# Patient Record
Sex: MALE | Race: WHITE | Employment: FULL TIME | URBAN - METROPOLITAN AREA
[De-identification: names, ages, dates, MRNs, and addresses within clinical notes are randomized per-mention and may not be internally consistent; named-entity substitution may affect disease eponyms.]

---

## 2017-01-30 PROBLEM — F41.9 ANXIETY: Status: ACTIVE | Noted: 2017-01-30

## 2017-03-02 ENCOUNTER — HOSPITAL ENCOUNTER (OUTPATIENT)
Dept: LAB | Age: 62
Discharge: HOME | End: 2017-03-02
Payer: COMMERCIAL

## 2017-03-02 LAB
ALBUMIN SERPL BCP-MCNC: 4.1 G/DL (ref 3.2–4.6)
ALBUMIN/GLOB SERPL: 1 {RATIO} (ref 1.2–3.5)
ALP SERPL-CCNC: 64 U/L (ref 50–136)
ALT SERPL-CCNC: 37 U/L (ref 12–65)
ANION GAP BLD CALC-SCNC: 9 MMOL/L (ref 7–16)
APTT PPP: 26.4 SEC (ref 25.3–32.9)
AST SERPL W P-5'-P-CCNC: 32 U/L (ref 15–37)
BASOPHILS # BLD AUTO: 0 K/UL (ref 0–0.2)
BASOPHILS # BLD: 1 % (ref 0–2)
BILIRUB SERPL-MCNC: 0.6 MG/DL (ref 0.2–1.1)
BUN SERPL-MCNC: 19 MG/DL (ref 8–23)
CALCIUM SERPL-MCNC: 9.2 MG/DL (ref 8.3–10.4)
CHLORIDE SERPL-SCNC: 100 MMOL/L (ref 98–107)
CO2 SERPL-SCNC: 28 MMOL/L (ref 21–32)
CREAT SERPL-MCNC: 0.76 MG/DL (ref 0.8–1.5)
DIFFERENTIAL METHOD BLD: ABNORMAL
EOSINOPHIL # BLD: 0.3 K/UL (ref 0–0.8)
EOSINOPHIL NFR BLD: 4 % (ref 0.5–7.8)
ERYTHROCYTE [DISTWIDTH] IN BLOOD BY AUTOMATED COUNT: 14.3 % (ref 11.9–14.6)
EST. AVERAGE GLUCOSE BLD GHB EST-MCNC: 177 MG/DL
FERRITIN SERPL-MCNC: 177 NG/ML (ref 8–388)
FOLATE SERPL-MCNC: 50.4 NG/ML (ref 3.1–17.5)
GLOBULIN SER CALC-MCNC: 4 G/DL (ref 2.3–3.5)
GLUCOSE SERPL-MCNC: 186 MG/DL (ref 65–100)
HBA1C MFR BLD: 7.8 % (ref 4.8–6)
HCT VFR BLD AUTO: 44.3 % (ref 41.1–50.3)
HGB BLD-MCNC: 14.8 G/DL (ref 13.6–17.2)
IMM GRANULOCYTES # BLD: 0 K/UL (ref 0–0.5)
IMM GRANULOCYTES NFR BLD AUTO: 0.6 % (ref 0–5)
INR PPP: 1.1 (ref 0.9–1.2)
IRON SATN MFR SERPL: 27 %
IRON SERPL-MCNC: 96 UG/DL (ref 35–150)
LYMPHOCYTES # BLD AUTO: 35 % (ref 13–44)
LYMPHOCYTES # BLD: 2.3 K/UL (ref 0.5–4.6)
MCH RBC QN AUTO: 30.1 PG (ref 26.1–32.9)
MCHC RBC AUTO-ENTMCNC: 33.4 G/DL (ref 31.4–35)
MCV RBC AUTO: 90.2 FL (ref 79.6–97.8)
MONOCYTES # BLD: 0.5 K/UL (ref 0.1–1.3)
MONOCYTES NFR BLD AUTO: 7 % (ref 4–12)
NEUTS SEG # BLD: 3.5 K/UL (ref 1.7–8.2)
NEUTS SEG NFR BLD AUTO: 52 % (ref 43–78)
PLATELET # BLD AUTO: 177 K/UL (ref 150–450)
PMV BLD AUTO: 10.7 FL (ref 10.8–14.1)
POTASSIUM SERPL-SCNC: 4.2 MMOL/L (ref 3.5–5.1)
PROT SERPL-MCNC: 8.1 G/DL (ref 6.3–8.2)
PROTHROMBIN TIME: 11.2 SEC (ref 9.6–12)
RBC # BLD AUTO: 4.91 M/UL (ref 4.23–5.67)
SODIUM SERPL-SCNC: 137 MMOL/L (ref 136–145)
TIBC SERPL-MCNC: 352 UG/DL (ref 250–450)
TSH SERPL DL<=0.005 MIU/L-ACNC: 0.93 UIU/ML (ref 0.36–3.74)
VIT B12 SERPL-MCNC: 1820 PG/ML (ref 193–986)
WBC # BLD AUTO: 6.6 K/UL (ref 4.3–11.1)

## 2017-03-02 PROCEDURE — 83036 HEMOGLOBIN GLYCOSYLATED A1C: CPT | Performed by: SURGERY

## 2017-03-02 PROCEDURE — 85730 THROMBOPLASTIN TIME PARTIAL: CPT | Performed by: SURGERY

## 2017-03-02 PROCEDURE — 82728 ASSAY OF FERRITIN: CPT | Performed by: SURGERY

## 2017-03-02 PROCEDURE — 80323 ALKALOIDS NOS: CPT | Performed by: SURGERY

## 2017-03-02 PROCEDURE — 84425 ASSAY OF VITAMIN B-1: CPT | Performed by: SURGERY

## 2017-03-02 PROCEDURE — 83540 ASSAY OF IRON: CPT | Performed by: SURGERY

## 2017-03-02 PROCEDURE — 85610 PROTHROMBIN TIME: CPT | Performed by: SURGERY

## 2017-03-02 PROCEDURE — 84443 ASSAY THYROID STIM HORMONE: CPT | Performed by: SURGERY

## 2017-03-02 PROCEDURE — 36415 COLL VENOUS BLD VENIPUNCTURE: CPT | Performed by: SURGERY

## 2017-03-02 PROCEDURE — 82607 VITAMIN B-12: CPT | Performed by: SURGERY

## 2017-03-02 PROCEDURE — 80053 COMPREHEN METABOLIC PANEL: CPT | Performed by: SURGERY

## 2017-03-02 PROCEDURE — 82746 ASSAY OF FOLIC ACID SERUM: CPT | Performed by: SURGERY

## 2017-03-02 PROCEDURE — 86677 HELICOBACTER PYLORI ANTIBODY: CPT | Performed by: SURGERY

## 2017-03-02 PROCEDURE — 85025 COMPLETE CBC W/AUTO DIFF WBC: CPT | Performed by: SURGERY

## 2017-03-02 PROCEDURE — 82652 VIT D 1 25-DIHYDROXY: CPT | Performed by: SURGERY

## 2017-03-03 LAB
1,25(OH)2D3 SERPL-MCNC: 49 PG/ML (ref 19.9–79.3)
H PYLORI AB SER QL: NEGATIVE
VIT B1 BLD-SCNC: 226.5 NMOL/L (ref 66.5–200)

## 2017-03-05 LAB
COTININE SERPL-MCNC: NORMAL NG/ML
NICOTINE SERPL-MCNC: NORMAL NG/ML

## 2017-03-06 ENCOUNTER — TELEPHONE (OUTPATIENT)
Dept: DIABETES SERVICES | Age: 62
End: 2017-03-06

## 2017-03-13 ENCOUNTER — HOSPITAL ENCOUNTER (OUTPATIENT)
Dept: SURGERY | Age: 62
Discharge: HOME | End: 2017-03-13

## 2017-03-16 PROBLEM — Z01.810 PREOP CARDIOVASCULAR EXAM: Status: ACTIVE | Noted: 2017-03-16

## 2017-03-23 ENCOUNTER — HOSPITAL ENCOUNTER (OUTPATIENT)
Dept: DIABETES SERVICES | Age: 62
Discharge: HOME | End: 2017-03-23
Attending: SURGERY
Payer: COMMERCIAL

## 2017-03-23 PROCEDURE — G0108 DIAB MANAGE TRN  PER INDIV: HCPCS

## 2017-03-23 NOTE — PROGRESS NOTES
Participant attended Diabetes #1 and #2  Education Session today prior to bariatric surgery. Topics included: Characteristics/pathophysiology type 1/type 2 diabetes; Goal/acceptable blood glucose ranges/Hgb A1C/interpreting/using results; Using medications safely; Sick day management; Prevention/detection/treatment of acute complications. Prevention/detection/treatment of chronic complications; sleep apnea; Developing strategies to promote health/change behavior/recommended screenings; Developing strategies to address psychosocial issues. Verbalized understanding of material covered.

## 2017-04-16 ENCOUNTER — ANESTHESIA EVENT (OUTPATIENT)
Dept: ENDOSCOPY | Age: 62
End: 2017-04-16
Payer: COMMERCIAL

## 2017-04-16 RX ORDER — SODIUM CHLORIDE 0.9 % (FLUSH) 0.9 %
5-10 SYRINGE (ML) INJECTION AS NEEDED
Status: CANCELLED | OUTPATIENT
Start: 2017-04-16

## 2017-04-16 RX ORDER — SODIUM CHLORIDE, SODIUM LACTATE, POTASSIUM CHLORIDE, CALCIUM CHLORIDE 600; 310; 30; 20 MG/100ML; MG/100ML; MG/100ML; MG/100ML
100 INJECTION, SOLUTION INTRAVENOUS CONTINUOUS
Status: CANCELLED | OUTPATIENT
Start: 2017-04-16

## 2017-04-17 ENCOUNTER — ANESTHESIA (OUTPATIENT)
Dept: ENDOSCOPY | Age: 62
End: 2017-04-17
Payer: COMMERCIAL

## 2017-04-17 ENCOUNTER — HOSPITAL ENCOUNTER (OUTPATIENT)
Age: 62
Setting detail: OUTPATIENT SURGERY
Discharge: HOME | End: 2017-04-17
Attending: SURGERY | Admitting: SURGERY
Payer: COMMERCIAL

## 2017-04-17 VITALS
WEIGHT: 315 LBS | RESPIRATION RATE: 16 BRPM | HEART RATE: 82 BPM | HEIGHT: 69 IN | DIASTOLIC BLOOD PRESSURE: 74 MMHG | BODY MASS INDEX: 46.65 KG/M2 | TEMPERATURE: 98.6 F | OXYGEN SATURATION: 95 % | SYSTOLIC BLOOD PRESSURE: 136 MMHG

## 2017-04-17 LAB — GLUCOSE BLD STRIP.AUTO-MCNC: 179 MG/DL (ref 65–100)

## 2017-04-17 PROCEDURE — 74011000250 HC RX REV CODE- 250

## 2017-04-17 PROCEDURE — 76060000031 HC ANESTHESIA FIRST 0.5 HR: Performed by: SURGERY

## 2017-04-17 PROCEDURE — 76040000025: Performed by: SURGERY

## 2017-04-17 PROCEDURE — 87081 CULTURE SCREEN ONLY: CPT | Performed by: SURGERY

## 2017-04-17 PROCEDURE — 77030009426 HC FCPS BIOP ENDOSC BSC -B: Performed by: SURGERY

## 2017-04-17 PROCEDURE — 88312 SPECIAL STAINS GROUP 1: CPT | Performed by: SURGERY

## 2017-04-17 PROCEDURE — 82962 GLUCOSE BLOOD TEST: CPT

## 2017-04-17 PROCEDURE — 74011250636 HC RX REV CODE- 250/636: Performed by: ANESTHESIOLOGY

## 2017-04-17 PROCEDURE — 88305 TISSUE EXAM BY PATHOLOGIST: CPT | Performed by: SURGERY

## 2017-04-17 PROCEDURE — 74011250636 HC RX REV CODE- 250/636

## 2017-04-17 RX ORDER — PROPOFOL 10 MG/ML
INJECTION, EMULSION INTRAVENOUS
Status: DISCONTINUED | OUTPATIENT
Start: 2017-04-17 | End: 2017-04-17 | Stop reason: HOSPADM

## 2017-04-17 RX ORDER — PROPOFOL 10 MG/ML
INJECTION, EMULSION INTRAVENOUS AS NEEDED
Status: DISCONTINUED | OUTPATIENT
Start: 2017-04-17 | End: 2017-04-17 | Stop reason: HOSPADM

## 2017-04-17 RX ORDER — LIDOCAINE HYDROCHLORIDE 20 MG/ML
INJECTION, SOLUTION EPIDURAL; INFILTRATION; INTRACAUDAL; PERINEURAL AS NEEDED
Status: DISCONTINUED | OUTPATIENT
Start: 2017-04-17 | End: 2017-04-17 | Stop reason: HOSPADM

## 2017-04-17 RX ORDER — SODIUM CHLORIDE, SODIUM LACTATE, POTASSIUM CHLORIDE, CALCIUM CHLORIDE 600; 310; 30; 20 MG/100ML; MG/100ML; MG/100ML; MG/100ML
100 INJECTION, SOLUTION INTRAVENOUS CONTINUOUS
Status: DISCONTINUED | OUTPATIENT
Start: 2017-04-17 | End: 2017-04-17 | Stop reason: HOSPADM

## 2017-04-17 RX ADMIN — LIDOCAINE HYDROCHLORIDE 100 MG: 20 INJECTION, SOLUTION EPIDURAL; INFILTRATION; INTRACAUDAL; PERINEURAL at 12:21

## 2017-04-17 RX ADMIN — PROPOFOL 20 MG: 10 INJECTION, EMULSION INTRAVENOUS at 12:22

## 2017-04-17 RX ADMIN — PROPOFOL 80 MG: 10 INJECTION, EMULSION INTRAVENOUS at 12:21

## 2017-04-17 RX ADMIN — SODIUM CHLORIDE, SODIUM LACTATE, POTASSIUM CHLORIDE, AND CALCIUM CHLORIDE 100 ML/HR: 600; 310; 30; 20 INJECTION, SOLUTION INTRAVENOUS at 11:19

## 2017-04-17 RX ADMIN — PROPOFOL 30 MG: 10 INJECTION, EMULSION INTRAVENOUS at 12:23

## 2017-04-17 RX ADMIN — PROPOFOL 180 MCG/KG/MIN: 10 INJECTION, EMULSION INTRAVENOUS at 12:21

## 2017-04-17 NOTE — DISCHARGE INSTRUCTIONS
Gastrointestinal Esophagogastroduodenoscopy (EGD) - Upper Exam Discharge Instructions    1. Call Dr. Xiomara Craig for any problems or questions. 2. Contact the doctor's office for follow up appointment as directed. 3. Medication may cause drowsiness for several hours, therefore, do not drive or  operate machinery for remainder of the day. 4. No alcohol today. 5. Ordinarily, you may resume regular diet and activity after exam unless otherwise specified by your physician. 6. For mild soreness in your throat you may use Cepacol throat lozenges or warm  salt-water gargles as needed. Any additional instructions:  Await biopsy results, take PPI until surgery. Instructions given to Alee Bullock. and other family members.   Instructions given by:  Dr. Marily Dowell

## 2017-04-17 NOTE — IP AVS SNAPSHOT
[address] 
[phone] Patient: Brooke Bustillos. MRN: [id number] ORE:1[birth year]77/[birth year]11 You are allergic to the following No active allergies Recent Documentation Height Weight BMI Smoking Status 1.753 m 145.2 kg 47.26 kg/m2 Former Smoker Emergency Contacts Name Discharge Info Relation Home Work Mobile Jack Davis CAREGIVER [3] Spouse [3] [phone] About your hospitalization You were admitted on:  April 17, 2017 You last received care in the:  [facility] You were discharged on:  April 17, 2017 Unit phone number:  [phone] Why you were hospitalized Your primary diagnosis was:  Not on File Providers Seen During Your Hospitalizations Provider Role Specialty Primary office phone eKnan Coto MD Attending Provider General Surgery [phone] Your Primary Care Physician (PCP) Primary Care Physician Office Phone Office Fax Anita Hawk [phone] Follow-up Information None Your Appointments Wednesday April 19, 2017  8:40 AM EDT  
CPAP with PP CPAP RESOURCE [address] [facility] [address] [place] [address] [facility]  
[phone] Current Discharge Medication List  
  
ASK your doctor about these medications Dose & Instructions Dispensing Information Comments Morning Noon Evening Bedtime ALPRAZolam 1 mg tablet Commonly known as:  Wandajaime Adamsonins Your last dose was: Your next dose is:    
   
   
 Dose:  1 mg Take 1 Tab by mouth two (2) times daily as needed for Anxiety. Max Daily Amount: 2 mg. Quantity:  180 Tab Refills:  0  
     
   
   
   
  
 aspirin 325 mg tablet Commonly known as:  ASPIRIN Your last dose was: Your next dose is: Dose:  325 mg Take 325 mg by mouth every evening. Indications: continue. was instructed to continue Refills:  0  
     
   
   
   
  
 atorvastatin 10 mg tablet Commonly known as:  LIPITOR Your last dose was: Your next dose is:    
   
   
 Dose:  10 mg Take 1 Tab by mouth daily. Quantity:  90 Tab Refills:  1  
     
   
   
   
  
 carvedilol 6.25 mg tablet Commonly known as:  Loli Sorenson Your last dose was: Your next dose is:    
   
   
 Dose:  12.5 mg Take 2 Tabs by mouth two (2) times a day. Quantity:  360 Tab Refills:  1  
     
   
   
   
  
 citalopram 20 mg tablet Commonly known as:  Caretha Nathanael Your last dose was: Your next dose is:    
   
   
 Dose:  20 mg Take 1 Tab by mouth daily. Quantity:  90 Tab Refills:  1  
     
   
   
   
  
 clopidogrel 75 mg Tab Commonly known as:  PLAVIX Your last dose was: Your next dose is:    
   
   
 Dose:  75 mg Take 1 Tab by mouth daily. Quantity:  90 Tab Refills:  1  
     
   
   
   
  
 cpap machine kit Your last dose was: Your next dose is:    
   
   
 by Does Not Apply route. autopap 6-18cm Refills:  0  
     
   
   
   
  
 FISH OIL PO Your last dose was: Your next dose is: Take  by mouth every morning. Indications: hold until after surgery Refills:  0  
     
   
   
   
  
 lisinopril 10 mg tablet Commonly known as:  Duke aMurice Your last dose was: Your next dose is:    
   
   
 Dose:  10 mg Take 1 Tab by mouth daily. Quantity:  90 Tab Refills:  1  
     
   
   
   
  
 metFORMIN 500 mg tablet Commonly known as:  GLUCOPHAGE Your last dose was: Your next dose is:    
   
   
 Dose:  1000 mg Take 2 Tabs by mouth two (2) times daily (with meals). Quantity:  360 Tab Refills:  1  
     
   
   
   
  
 multivitamin tablet Commonly known as:  ONE A DAY  
   
 Your last dose was: Your next dose is:    
   
   
 Dose:  1 Tab Take 1 Tab by mouth daily. Indications: hold until after surgery Refills:  0  
     
   
   
   
  
 nitroglycerin 0.4 mg SL tablet Commonly known as:  NITROSTAT Your last dose was: Your next dose is:    
   
   
 Dose:  0.4 mg  
1 Tab by SubLINGual route every five (5) minutes as needed for Chest Pain. Indications: ANGINA Quantity:  25 Tab Refills:  11 VITAMIN B-12 500 mcg tablet Generic drug:  cyanocobalamin Your last dose was: Your next dose is:    
   
   
 Dose:  500 mcg Take 500 mcg by mouth daily. Refills:  0 Discharge Instructions Gastrointestinal Esophagogastroduodenoscopy (EGD) - Upper Exam Discharge Instructions 1. Call Dr. Deidra Reyna for any problems or questions. 2. Contact the doctor's office for follow up appointment as directed. 3. Medication may cause drowsiness for several hours, therefore, do not drive or  operate machinery for remainder of the day. 4. No alcohol today. 5. Ordinarily, you may resume regular diet and activity after exam unless otherwise specified by your physician. 6. For mild soreness in your throat you may use Cepacol throat lozenges or warm  salt-water gargles as needed. Any additional instructions:  Await biopsy results, take PPI until surgery. Instructions given to Tammie Scott. and other family members. Instructions given by:  Dr. Danette Mathew Discharge Orders None Introducing [facility] SERVICES! [place] Sar[employer] introduces [employer] patient portal. Now you can access parts of your medical record, email your doctor's office, and request medication refills online. 1. In your internet browser, go to [email or web address] 2. Click on the First Time User? Click Here link in the Sign In box. You will see the New Member Sign Up page. 3. Enter your [employer] Access Code exactly as it appears below. You will not need to use this code after youve completed the sign-up process. If you do not sign up before the expiration date, you must request a new code. · [employer] Access Code: [email or web address] Expires: 4/30/2017  4:50 PM 
 
4. Enter the last four digits of your Social Security Number (xxxx) and Date of Birth (mm/dd/yyyy) as indicated and click Submit. You will be taken to the next sign-up page. 5. Create a [employer] ID. This will be your [employer] login ID and cannot be changed, so think of one that is secure and easy to remember. 6. Create a [employer] password. You can change your password at any time. 7. Enter your Password Reset Question and Answer. This can be used at a later time if you forget your password. 8. Enter your e-mail address. You will receive e-mail notification when new information is available in [address] 9. Click Sign Up. You can now view and download portions of your medical record. 10. Click the Download Summary menu link to download a portable copy of your medical information. If you have questions, please visit the Frequently Asked Questions section of the [employer] website. Remember, [employer] is NOT to be used for urgent needs. For medical emergencies, dial 911. Now available from your iPhone and Android! General Information Please provide this summary of care documentation to your next provider. Patient Signature:  ____________________________________________________________ Date:  ____________________________________________________________  
  
Kira Cons Provider Signature:  ____________________________________________________________ Date:  ____________________________________________________________

## 2017-04-17 NOTE — LETTER
4/17/2017 12:30 PM 
Calvin Baron MD 
[facility] General Surgery Endoscopy Director of Robotic Surgery [facility], [address] [place] [address] 
[phone] Mr. Mills Nicky. [phone] Dear Gene Saunders. You have some mild inflammation of the stomach. I would like you to take Prilosec (the little purple pill) or nexium one per day until surgery. This should calm it down before your up coming operation. I did two routine biopsies and my office will call you with the results. Its probably from his aspirin. Thank you and see you soon.  
 
Sincerely, 
 
 
Inga Chacon MD

## 2017-04-17 NOTE — Clinical Note
Omar Nielsen, He should be on a PPI for a month before surgery. Can you call him in prilosec  20 mg per day for 2 months please.

## 2017-04-17 NOTE — ANESTHESIA POSTPROCEDURE EVALUATION
Post-Anesthesia Evaluation and Assessment    Patient: Gayle Hill MRN: [id number]  SSN: [id number]    YOB: 1955  Age: 62 y.o. Sex: male       Cardiovascular Function/Vital Signs  Visit Vitals    /75    Pulse 80    Temp 37 °C (98.6 °F)    Resp 12    Ht 5' 9\" (1.753 m)    Wt 145.2 kg (320 lb)    SpO2 99%    BMI 47.26 kg/m2       Patient is status post total IV anesthesia anesthesia for Procedure(s):  ESOPHAGOGASTRODUODENOSCOPY (EGD)  BMI 48  ESOPHAGOGASTRODUODENAL (EGD) BIOPSY. Nausea/Vomiting: None    Postoperative hydration reviewed and adequate. Pain:  Pain Scale 1: Numeric (0 - 10) (04/17/17 1109)  Pain Intensity 1: 0 (04/17/17 1109)   Managed    Neurological Status: At baseline    Mental Status and Level of Consciousness: Alert and oriented     Pulmonary Status:   O2 Device: CO2 nasal cannula (04/17/17 1233)   Adequate oxygenation and airway patent    Complications related to anesthesia: None    Post-anesthesia assessment completed.  No concerns    Signed By: Johnie Schroeder MD     April 17, 2017

## 2017-04-17 NOTE — ANESTHESIA PREPROCEDURE EVALUATION
Anesthetic History               Review of Systems / Medical History  Patient summary reviewed, nursing notes reviewed and pertinent labs reviewed    Pulmonary        Sleep apnea: CPAP  Smoker (former 90 pack years)         Neuro/Psych              Cardiovascular    Hypertension          Past MI (x2), CAD and cardiac stents (x8 - last '04)  Pertinent negatives: No angina  Exercise tolerance: >4 METS  Comments:  On Plavix - last dose 5d ago - 325mg ASA last night   GI/Hepatic/Renal                Endo/Other    Diabetes: type 2    Morbid obesity     Other Findings              Physical Exam    Airway  Mallampati: III  TM Distance: > 6 cm  Neck ROM: normal range of motion   Mouth opening: Normal     Cardiovascular    Rhythm: regular  Rate: normal         Dental    Dentition: Full lower dentures and Full upper dentures     Pulmonary  Breath sounds clear to auscultation               Abdominal         Other Findings            Anesthetic Plan    ASA: 3  Anesthesia type: total IV anesthesia          Induction: Intravenous  Anesthetic plan and risks discussed with: Patient

## 2017-04-17 NOTE — ROUTINE PROCESS
Discharge instructions given to wife. Pt tolerating liquids well. Pt's IV discontinued and skin c/d/i. Pt ready for discharge.

## 2017-04-17 NOTE — OP NOTES
Calvin Swartz MD  [facility]  [address]  [place]   [phone]      Esophagogastroduodenoscopy Procedure Note      Patient: Cristal Nova MRN: [id number]  SSN: [id number]    YOB: 1955  Age: 62 y.o. Sex: male           Procedure in Detail:  Informed consent was obtained for the procedure, the patient was brought to the endoscopy suite and sedation was induced by anesthesia. The [id number] gastroscope was inserted into the mouth and advanced under direct vision to second portion of the duodenum. A careful inspection was made as the gastroscope was withdrawn, including a retroflexed view of the proximal stomach; findings and interventions are described below, with appropriate photodocumentation obtained. Findings:   OROPHARYNX: Cords and pyriform recesses normal.   ESOPHAGUS: The esophagus is normal. The proximal, mid, and distal portions are normal. The Z-Line is intact. STOMACH: The fundus on antegrade and retroflex views is normal. The body, and pylorus are normal.   DUODENUM: The bulb and second portions are normal.  Antrum of the stomach:     - inflammatory changes no ulcers. Therapies:    One cold biopsy for JUDY and one for pathology. EBL-  minimal    Specimens: Specimens were collected and sent to pathology. Complications:   None; patient tolerated the procedure well. Recommendations:  - Acid suppression with a proton pump inhibitor.  - Await pathology. - Await JUDY test result and treat for Helicobacter pylori if positive. - Follow up with me.     Signed by: Wily Walker MD                         April 17, 2017

## 2017-04-17 NOTE — H&P
History and Physical     Patient: Missy Jama. MRN: [id number]  SSN: [id number]    YOB: 1955  Age: 62 y.o. Sex: male              PCP: Haritha Deleon MD   Date: 2/28/2017  I have seen and examined this patient today and imported her H&P from clinic below. I have reviewed it and there are no interval changes. I have gone over the risks and benefits of the procedure in detail in the office and have given the patient the opportunity to ask questions and have answered them to their satisfaction. He is cleared and wishes to proceed with his EGD today. Abram Smith MD            Missy Jama. who presents to the [facility] for consideration of bariatric surgery. He is interested in the his wife procedure. This is his initial consultation preparing him for our multi-disciplinary surgical preparatory regimen and he is accompanied by his wife Serafin Collins today. He presents with a height of 5' 9\" (1.753 m) and weight of 328 lb (148.8 kg), giving him a Body mass index is 48.44 kg/(m^2). Esha Yanes He has an Ideal body weight of 169 lbs, and excess body weight of 159 lbs.      PCP: Haritha Deleon MD      Insurance requirements: BCBS [place], no supervised diet requirement  Live seminar      The patient is followed by Haritha Deleon MD for problems including: Morbid Obesity  Sleep Apnea-yes uses CPAP-yes   Diabetes-yes Insulin Dependent-No last A1C in January 2017 = 8.2  Hypertension-yes How many medications-1  Hyperlipidemia-yes  Coronary Artery Disease, Stents, MI in 2000 and 2008, on Plavix and ASA, notes history of 8 stents      COLONOSCOPY  5-10-16 [facility] for noted blood in stools:  Post-operative Diagnosis: Distal colitis - sigmoid; biopsies  Diverticulosis - moderate, left  Polyps - x 2 transverse, cautery and snare. Lipoma ascending  Pathology: DIAGNOSIS :  A: TRANSVERSE POLYPS: FRAGMENTS OF HYPERPLASTIC POLYP, FOCALLY ACUTELY INFLAMED.    B: SIGMOID COLON BIOPSIES: FRAGMENTS OF LARGE INTESTINE WITH ACUTE COLITIS INCLUDING CRYPT ABSCESS FORMATION. SIGNIFICANT DYSPLASIA IS NOT IDENTIFIED.         PRIOR WEIGHT LOSS ATTEMPTS: Weight Watchers, Bigg Alvarez, Dietitian, [facility] [employer] Self      EXERCISE ASSESSMENT: States he exercises 3x/week. 1 hour sessions of cardio and weights.      PSYCHOSOCIAL:  He notes he is  and states main support person is his wife Efrain Pitts. He is employed as a . His goal in pursuing surgical weight loss is to improve health. He reports appropriate treatment for depression/anxiety. He states he is independent in his care, can drive a car, and can ambulate without assistance.     HISTORY:       Past Medical History:   Diagnosis Date    Anxiety      Blood in stool      Coronary atherosclerosis of unspecified type of vessel, native or graft      Former smoker       3 pk per day for 30 yrs    History of diverticulosis      History of MI (myocardial infarction) 2000 and 2004     [facility] cardiology    Morbid obesity ([facility] [address]       46 bmi    Platelet inhibition due to Plavix       was instructed to hold 5 days prior per GI    Sleep apnea        cpap    Stented coronary artery x 8     last ones in 2004    Type II or unspecified type diabetes mellitus without mention of complication, not stated as uncontrolled dx 2 yrs     oral agents. does not check sugar daily         He denies personal or family history of problems with anesthesia, bleeding, or clotting. Patient is on Plavix and  mg. He denies history of DVT or pulmonary embolism. History of MI 2000 and 2008, and 8 stents. He denies reflux or dysphagia.           Past Surgical History:   Procedure Laterality Date    HX CORONARY STENT PLACEMENT         total of 8 stents over time.     HX HERNIA REPAIR   2002             Social History   Substance Use Topics    Smoking status: Former Smoker       Packs/day: 3.00       Years: 30.00       Types: Cigarettes       Quit date: 1/6/2009    Smokeless tobacco: Never Used         Comment: quit 2009    Alcohol use No             Family History   Problem Relation Age of Onset    Cancer Mother         breast    Cancer Father         colon    Diabetes Father            MEDICATIONS:       Current Outpatient Prescriptions   Medication Sig    cyanocobalamin (VITAMIN B-12) 500 mcg tablet Take 500 mcg by mouth daily.  ALPRAZolam (XANAX) 1 mg tablet Take 1 Tab by mouth two (2) times daily as needed for Anxiety. Max Daily Amount: 2 mg.  lisinopril (PRINIVIL, ZESTRIL) 10 mg tablet Take 1 Tab by mouth daily.  metFORMIN (GLUCOPHAGE) 500 mg tablet Take 2 Tabs by mouth two (2) times daily (with meals).  atorvastatin (LIPITOR) 10 mg tablet Take 1 Tab by mouth daily.  clopidogrel (PLAVIX) 75 mg tab Take 1 Tab by mouth daily.  citalopram (CELEXA) 20 mg tablet Take 1 Tab by mouth daily.  carvedilol (COREG) 6.25 mg tablet Take 2 Tabs by mouth two (2) times a day.  multivitamin (ONE A DAY) tablet Take 1 Tab by mouth daily. Indications: hold until after surgery    aspirin (ASPIRIN) 325 mg tablet Take 325 mg by mouth every evening. Indications: continue. was instructed to continue    DOCOSAHEXANOIC ACID/EPA (FISH OIL PO) Take by mouth every morning. Indications: hold until after surgery    cpap machine kit by Does Not Apply route. autopap 6-18cm      No current facility-administered medications for this visit.          ALLERGIES:  No Known Allergies     ROS: The patient has no difficulty with chest pain or shortness of breath. No fever or chills. Comprehensive 13 point review of systems was otherwise unremarkable except as noted above.     PHYSICAL EXAM:        Visit Vitals    /89    Pulse 67    Ht 5' 9\" (1.753 m)    Wt 328 lb (148.8 kg)    BMI 48.44 kg/m2         General: Alert oriented cooperative patient in no acute distress. Eyes: Sclera are clear. Conjunctiva and lids within normal limits.  No icterus. Ears and Nose: no gross deformities to visual inspection, gross hearing intact  Neck: Supple, trachea midline, no appreciable thyromegaly  Resp: No JVD. Breathing is non-labored. Lungs clear to auscultation without wheezing or rhonchi   CV: RRR. No murmurs, rubs or gallops appreciated, Carotid bruits are absent  Abd: soft non-tender and non-distended without peritoneal signs. +bs   Extremities: non-tender. No edema   Neuro: No focal signs  Psych: Mood and affect appropriate. Short-term memory and understanding intact     ASSESSMENT:  Morbid obesity with a Body mass index is 48.44 kg/(m^2). beginning multi-disciplinary surgical prep program.     PLAN:  We have discussed the patient's participation and reviewed the steps in our preparatory program. He has had a long history of failed diet and exercise programs and has good understanding about the risks, benefits, and commitment related to bariatric surgery. He has attended our comprehensive educational seminar and is interested in proceeding with our program seeking the sleeve gastrectomy.      He will complete the following steps:  1. Complete bariatric labs after dietitian evaluation. 2. Participation in our behavior modification program, reduced calorie diet program, and participation in our exercise program recommendations supervised by our office and registered dietitian and , Josemanuel Layne RD,LD, CPT. 3. Complete our required psychological evaluation and attend our surgical weight loss support group. 4. Demonstrate commitment to regular exercise routine during prep period with minimum goal of 40-45 minutes of exercise 4 x/week per NIH guidelines. 6. Reminded of regular postoperative office visit schedule of : 2 weeks, 4 weeks, 6 weeks, 3 months, 6 months, 1 year, then annually. 7. Cardiac Clearance with Clearance to Hold Plavix, 7 days prior to surgery. Clearance for both EGD and Surgery.  Patient requests [facility] to establish care with their practice. Will remain on  mg for both procedures. 8. Pulmonary Clearance with Dr. Jt Banegas, patient know to [address] 9. Diabetic Education Class. 10. EGD: Diagnosis: History of Hematochezia and long term ASA use. 11. Plan for post surgery home Lovenox injections.     He will return after the above are complete for assessment of his progress and schedule surgery. Alternative weight loss procedures were discussed that may have additional benefit in his situation.  I advised him read about them and let me know if he changes his mind.  We will proceed at his request with a planned sleeve gastrectomy as his procedure of choice for weight loss. We discussed the necessary exercise and dietary requirements to be successful after weight loss surgery. I also told him any weigh gain during this process or failure to comply with the dietary plan will delay his surgery or I may even cancel it until he resumes compliance. he understand and wishes to proceed with the preoperative evaluation. This is a 45 minute visit and over 50% of the time was spent in discussion face to face with the patient regarding all of these issues.     Calvin Proctor MD

## 2017-04-18 LAB
H PYLORI AG TISS QL IMSTN: NEGATIVE
H PYLORI AG TISS QL IMSTN: POSITIVE

## 2017-05-12 ENCOUNTER — HOSPITAL ENCOUNTER (OUTPATIENT)
Dept: SURGERY | Age: 62
Discharge: HOME | End: 2017-05-12
Attending: SURGERY
Payer: COMMERCIAL

## 2017-05-12 VITALS
SYSTOLIC BLOOD PRESSURE: 120 MMHG | DIASTOLIC BLOOD PRESSURE: 78 MMHG | HEIGHT: 69 IN | WEIGHT: 315 LBS | TEMPERATURE: 97.8 F | HEART RATE: 70 BPM | OXYGEN SATURATION: 95 % | RESPIRATION RATE: 16 BRPM | BODY MASS INDEX: 46.65 KG/M2

## 2017-05-12 LAB
GLUCOSE BLD STRIP.AUTO-MCNC: 174 MG/DL (ref 65–100)
HGB BLD-MCNC: 14.5 G/DL (ref 13.6–17.2)

## 2017-05-12 PROCEDURE — 85018 HEMOGLOBIN: CPT | Performed by: ANESTHESIOLOGY

## 2017-05-12 PROCEDURE — 82962 GLUCOSE BLOOD TEST: CPT

## 2017-05-12 NOTE — PERIOP NOTES
Recent Results (from the past 8 hour(s))   HEMOGLOBIN    Collection Time: 05/12/17  1:00 PM   Result Value Ref Range    HGB 14.5 13.6 - 17.2 g/dL   GLUCOSE, POC    Collection Time: 05/12/17  1:10 PM   Result Value Ref Range    Glucose (POC) 174 (H) 65 - 100 mg/dL

## 2017-05-23 ENCOUNTER — ANESTHESIA EVENT (OUTPATIENT)
Dept: SURGERY | Age: 62
DRG: 621 | End: 2017-05-23
Payer: COMMERCIAL

## 2017-05-23 RX ORDER — FENTANYL CITRATE 50 UG/ML
100 INJECTION, SOLUTION INTRAMUSCULAR; INTRAVENOUS ONCE
Status: CANCELLED | OUTPATIENT
Start: 2017-05-23 | End: 2017-05-23

## 2017-05-24 ENCOUNTER — ANESTHESIA (OUTPATIENT)
Dept: SURGERY | Age: 62
DRG: 621 | End: 2017-05-24
Payer: COMMERCIAL

## 2017-05-24 ENCOUNTER — HOSPITAL ENCOUNTER (INPATIENT)
Age: 62
LOS: 1 days | Discharge: HOME | DRG: 621 | End: 2017-05-25
Attending: SURGERY | Admitting: SURGERY
Payer: COMMERCIAL

## 2017-05-24 DIAGNOSIS — E66.01 MORBID OBESITY DUE TO EXCESS CALORIES (HCC): Primary | Chronic | ICD-10-CM

## 2017-05-24 LAB — GLUCOSE BLD STRIP.AUTO-MCNC: 169 MG/DL (ref 65–100)

## 2017-05-24 PROCEDURE — 77030010507 HC ADH SKN DERMBND J&J -B: Performed by: SURGERY

## 2017-05-24 PROCEDURE — 77030035277 HC OBTRTR BLDELSS DISP INTU -B: Performed by: SURGERY

## 2017-05-24 PROCEDURE — 82962 GLUCOSE BLOOD TEST: CPT

## 2017-05-24 PROCEDURE — 74011250636 HC RX REV CODE- 250/636: Performed by: SURGERY

## 2017-05-24 PROCEDURE — 0DJ08ZZ INSPECTION OF UPPER INTESTINAL TRACT, VIA NATURAL OR ARTIFICIAL OPENING ENDOSCOPIC: ICD-10-PCS | Performed by: SURGERY

## 2017-05-24 PROCEDURE — 0DB64Z3 EXCISION OF STOMACH, PERCUTANEOUS ENDOSCOPIC APPROACH, VERTICAL: ICD-10-PCS | Performed by: SURGERY

## 2017-05-24 PROCEDURE — 77030008477 HC STYL SATN SLP COVD -A: Performed by: NURSE ANESTHETIST, CERTIFIED REGISTERED

## 2017-05-24 PROCEDURE — 77030019940 HC BLNKT HYPOTHRM STRY -B: Performed by: NURSE ANESTHETIST, CERTIFIED REGISTERED

## 2017-05-24 PROCEDURE — 74011000250 HC RX REV CODE- 250

## 2017-05-24 PROCEDURE — 74011250636 HC RX REV CODE- 250/636: Performed by: ANESTHESIOLOGY

## 2017-05-24 PROCEDURE — 74011000250 HC RX REV CODE- 250: Performed by: ANESTHESIOLOGY

## 2017-05-24 PROCEDURE — 65270000029 HC RM PRIVATE

## 2017-05-24 PROCEDURE — 74011250636 HC RX REV CODE- 250/636

## 2017-05-24 PROCEDURE — 94760 N-INVAS EAR/PLS OXIMETRY 1: CPT

## 2017-05-24 PROCEDURE — 77030008518 HC TBNG INSUF ENDO STRY -B: Performed by: SURGERY

## 2017-05-24 PROCEDURE — 77030018836 HC SOL IRR NACL ICUM -A: Performed by: SURGERY

## 2017-05-24 PROCEDURE — 77030008703 HC TU ET UNCUF COVD -A: Performed by: NURSE ANESTHETIST, CERTIFIED REGISTERED

## 2017-05-24 PROCEDURE — 76010000876 HC OR TIME 2 TO 2.5HR INTENSV - TIER 2: Performed by: SURGERY

## 2017-05-24 PROCEDURE — 77030016151 HC PROTCTR LNS DFOG COVD -B: Performed by: SURGERY

## 2017-05-24 PROCEDURE — 77030035048 HC TRCR ENDOSC OPTCL COVD -B: Performed by: SURGERY

## 2017-05-24 PROCEDURE — 77030032490 HC SLV COMPR SCD KNE COVD -B: Performed by: SURGERY

## 2017-05-24 PROCEDURE — 77030035044 HC TRCR ENDOSC VRSPRT BLDLSS COVD -C: Performed by: SURGERY

## 2017-05-24 PROCEDURE — 88312 SPECIAL STAINS GROUP 1: CPT | Performed by: SURGERY

## 2017-05-24 PROCEDURE — 77030009965 HC RELD STPLR ENDOS COVD -D: Performed by: SURGERY

## 2017-05-24 PROCEDURE — 77030035042 HC TRCR ENDOSC OPTCL BLDLSS COVD -D: Performed by: SURGERY

## 2017-05-24 PROCEDURE — 77030030537 HC STPLR ENDO GIA COVD -C: Performed by: SURGERY

## 2017-05-24 PROCEDURE — 77030037367 HC STPLR ENDO TRI-STPLR COVD -D: Performed by: SURGERY

## 2017-05-24 PROCEDURE — 77030012022 HC APPL CLP ENDOSC COVD -C: Performed by: SURGERY

## 2017-05-24 PROCEDURE — 77030008729 HC TU GAST LAPSCP J&J -C: Performed by: SURGERY

## 2017-05-24 PROCEDURE — 77010033678 HC OXYGEN DAILY

## 2017-05-24 PROCEDURE — 77030008437 HC REINF STRP REINF SEMGD WLGO -C: Performed by: SURGERY

## 2017-05-24 PROCEDURE — 88305 TISSUE EXAM BY PATHOLOGIST: CPT | Performed by: SURGERY

## 2017-05-24 PROCEDURE — 77030000038 HC TIP SCIS LAPSCP SURI -B: Performed by: SURGERY

## 2017-05-24 PROCEDURE — 74011000250 HC RX REV CODE- 250: Performed by: SURGERY

## 2017-05-24 PROCEDURE — 77030029640 HC SEAL VSL ENDOWR ONE INTU -F: Performed by: SURGERY

## 2017-05-24 PROCEDURE — 77030032625 HC DEV LAPSCP VESL SEAL AMR -F: Performed by: SURGERY

## 2017-05-24 PROCEDURE — 77030011640 HC PAD GRND REM COVD -A: Performed by: SURGERY

## 2017-05-24 PROCEDURE — 77030008756 HC TU IRR SUC STRY -B: Performed by: SURGERY

## 2017-05-24 PROCEDURE — 77030031139 HC SUT VCRL2 J&J -A: Performed by: SURGERY

## 2017-05-24 PROCEDURE — 77030010351 HC TRCR ENDOSC VSTP COVD -B: Performed by: SURGERY

## 2017-05-24 PROCEDURE — 76210000016 HC OR PH I REC 1 TO 1.5 HR: Performed by: SURGERY

## 2017-05-24 PROCEDURE — 76060000035 HC ANESTHESIA 2 TO 2.5 HR: Performed by: SURGERY

## 2017-05-24 DEVICE — STAPLER INT 60 UNIV PUR W/ TRI-STAPLE TECHNOLOGY ULT FOR: Type: IMPLANTABLE DEVICE | Site: STOMACH | Status: FUNCTIONAL

## 2017-05-24 DEVICE — STAPLER INT 60 UNIV BLK W TRI STAPLE TECHNOLOGY ULT FOR 4: Type: IMPLANTABLE DEVICE | Site: STOMACH | Status: FUNCTIONAL

## 2017-05-24 RX ORDER — GUAIFENESIN 100 MG/5ML
81 LIQUID (ML) ORAL DAILY
Status: ON HOLD | COMMUNITY
End: 2017-05-24

## 2017-05-24 RX ORDER — NALOXONE HYDROCHLORIDE 0.4 MG/ML
0.1 INJECTION, SOLUTION INTRAMUSCULAR; INTRAVENOUS; SUBCUTANEOUS AS NEEDED
Status: DISCONTINUED | OUTPATIENT
Start: 2017-05-24 | End: 2017-05-24 | Stop reason: HOSPADM

## 2017-05-24 RX ORDER — SODIUM CHLORIDE, SODIUM LACTATE, POTASSIUM CHLORIDE, CALCIUM CHLORIDE 600; 310; 30; 20 MG/100ML; MG/100ML; MG/100ML; MG/100ML
100 INJECTION, SOLUTION INTRAVENOUS CONTINUOUS
Status: DISCONTINUED | OUTPATIENT
Start: 2017-05-24 | End: 2017-05-24 | Stop reason: HOSPADM

## 2017-05-24 RX ORDER — APREPITANT 40 MG/1
40 CAPSULE ORAL ONCE
Status: COMPLETED | OUTPATIENT
Start: 2017-05-24 | End: 2017-05-24

## 2017-05-24 RX ORDER — CEFAZOLIN SODIUM IN 0.9 % NACL 2 G/50 ML
2 INTRAVENOUS SOLUTION, PIGGYBACK (ML) INTRAVENOUS EVERY 8 HOURS
Status: COMPLETED | OUTPATIENT
Start: 2017-05-24 | End: 2017-05-25

## 2017-05-24 RX ORDER — SODIUM CHLORIDE, SODIUM LACTATE, POTASSIUM CHLORIDE, CALCIUM CHLORIDE 600; 310; 30; 20 MG/100ML; MG/100ML; MG/100ML; MG/100ML
125 INJECTION, SOLUTION INTRAVENOUS CONTINUOUS
Status: DISCONTINUED | OUTPATIENT
Start: 2017-05-24 | End: 2017-05-25

## 2017-05-24 RX ORDER — DEXAMETHASONE SODIUM PHOSPHATE 4 MG/ML
INJECTION, SOLUTION INTRA-ARTICULAR; INTRALESIONAL; INTRAMUSCULAR; INTRAVENOUS; SOFT TISSUE AS NEEDED
Status: DISCONTINUED | OUTPATIENT
Start: 2017-05-24 | End: 2017-05-24 | Stop reason: HOSPADM

## 2017-05-24 RX ORDER — ONDANSETRON 2 MG/ML
4 INJECTION INTRAMUSCULAR; INTRAVENOUS ONCE
Status: DISCONTINUED | OUTPATIENT
Start: 2017-05-24 | End: 2017-05-24

## 2017-05-24 RX ORDER — ONDANSETRON 2 MG/ML
INJECTION INTRAMUSCULAR; INTRAVENOUS AS NEEDED
Status: DISCONTINUED | OUTPATIENT
Start: 2017-05-24 | End: 2017-05-24

## 2017-05-24 RX ORDER — DEXTROSE 50 % IN WATER (D50W) INTRAVENOUS SYRINGE
25-50 AS NEEDED
Status: DISCONTINUED | OUTPATIENT
Start: 2017-05-24 | End: 2017-05-25 | Stop reason: HOSPADM

## 2017-05-24 RX ORDER — ALBUTEROL SULFATE 0.83 MG/ML
2.5 SOLUTION RESPIRATORY (INHALATION) AS NEEDED
Status: DISCONTINUED | OUTPATIENT
Start: 2017-05-24 | End: 2017-05-24 | Stop reason: HOSPADM

## 2017-05-24 RX ORDER — SODIUM CHLORIDE 0.9 % (FLUSH) 0.9 %
5-10 SYRINGE (ML) INJECTION EVERY 8 HOURS
Status: DISCONTINUED | OUTPATIENT
Start: 2017-05-24 | End: 2017-05-25 | Stop reason: HOSPADM

## 2017-05-24 RX ORDER — ONDANSETRON 2 MG/ML
4 INJECTION INTRAMUSCULAR; INTRAVENOUS
Status: DISCONTINUED | OUTPATIENT
Start: 2017-05-24 | End: 2017-05-25 | Stop reason: HOSPADM

## 2017-05-24 RX ORDER — LIDOCAINE HYDROCHLORIDE 10 MG/ML
0.1 INJECTION INFILTRATION; PERINEURAL AS NEEDED
Status: DISCONTINUED | OUTPATIENT
Start: 2017-05-24 | End: 2017-05-24 | Stop reason: HOSPADM

## 2017-05-24 RX ORDER — HEPARIN SODIUM 5000 [USP'U]/ML
5000 INJECTION, SOLUTION INTRAVENOUS; SUBCUTANEOUS EVERY 8 HOURS
Status: DISCONTINUED | OUTPATIENT
Start: 2017-05-24 | End: 2017-05-25 | Stop reason: HOSPADM

## 2017-05-24 RX ORDER — FENTANYL CITRATE 50 UG/ML
INJECTION, SOLUTION INTRAMUSCULAR; INTRAVENOUS AS NEEDED
Status: DISCONTINUED | OUTPATIENT
Start: 2017-05-24 | End: 2017-05-24 | Stop reason: HOSPADM

## 2017-05-24 RX ORDER — MIDAZOLAM HYDROCHLORIDE 1 MG/ML
2 INJECTION, SOLUTION INTRAMUSCULAR; INTRAVENOUS ONCE
Status: COMPLETED | OUTPATIENT
Start: 2017-05-24 | End: 2017-05-24

## 2017-05-24 RX ORDER — MIDAZOLAM HYDROCHLORIDE 1 MG/ML
2 INJECTION, SOLUTION INTRAMUSCULAR; INTRAVENOUS
Status: DISCONTINUED | OUTPATIENT
Start: 2017-05-24 | End: 2017-05-24

## 2017-05-24 RX ORDER — GLYCOPYRROLATE 0.2 MG/ML
INJECTION INTRAMUSCULAR; INTRAVENOUS AS NEEDED
Status: DISCONTINUED | OUTPATIENT
Start: 2017-05-24 | End: 2017-05-24 | Stop reason: HOSPADM

## 2017-05-24 RX ORDER — LORAZEPAM 2 MG/ML
1 INJECTION INTRAMUSCULAR
Status: DISCONTINUED | OUTPATIENT
Start: 2017-05-24 | End: 2017-05-25 | Stop reason: HOSPADM

## 2017-05-24 RX ORDER — BUPIVACAINE HYDROCHLORIDE AND EPINEPHRINE 2.5; 5 MG/ML; UG/ML
INJECTION, SOLUTION EPIDURAL; INFILTRATION; INTRACAUDAL; PERINEURAL AS NEEDED
Status: DISCONTINUED | OUTPATIENT
Start: 2017-05-24 | End: 2017-05-24 | Stop reason: HOSPADM

## 2017-05-24 RX ORDER — DEXTROSE 40 %
1 GEL (GRAM) ORAL AS NEEDED
Status: DISCONTINUED | OUTPATIENT
Start: 2017-05-24 | End: 2017-05-25 | Stop reason: HOSPADM

## 2017-05-24 RX ORDER — NEOSTIGMINE METHYLSULFATE 1 MG/ML
INJECTION INTRAVENOUS AS NEEDED
Status: DISCONTINUED | OUTPATIENT
Start: 2017-05-24 | End: 2017-05-24 | Stop reason: HOSPADM

## 2017-05-24 RX ORDER — ACETAMINOPHEN 10 MG/ML
INJECTION, SOLUTION INTRAVENOUS AS NEEDED
Status: DISCONTINUED | OUTPATIENT
Start: 2017-05-24 | End: 2017-05-24

## 2017-05-24 RX ORDER — LIDOCAINE HYDROCHLORIDE 20 MG/ML
INJECTION, SOLUTION EPIDURAL; INFILTRATION; INTRACAUDAL; PERINEURAL AS NEEDED
Status: DISCONTINUED | OUTPATIENT
Start: 2017-05-24 | End: 2017-05-24 | Stop reason: HOSPADM

## 2017-05-24 RX ORDER — KETOROLAC TROMETHAMINE 30 MG/ML
30 INJECTION, SOLUTION INTRAMUSCULAR; INTRAVENOUS
Status: DISPENSED | OUTPATIENT
Start: 2017-05-24 | End: 2017-05-25

## 2017-05-24 RX ORDER — ROCURONIUM BROMIDE 10 MG/ML
INJECTION, SOLUTION INTRAVENOUS AS NEEDED
Status: DISCONTINUED | OUTPATIENT
Start: 2017-05-24 | End: 2017-05-24 | Stop reason: HOSPADM

## 2017-05-24 RX ORDER — HYDROMORPHONE HYDROCHLORIDE 1 MG/ML
1 INJECTION, SOLUTION INTRAMUSCULAR; INTRAVENOUS; SUBCUTANEOUS
Status: DISCONTINUED | OUTPATIENT
Start: 2017-05-24 | End: 2017-05-25 | Stop reason: HOSPADM

## 2017-05-24 RX ORDER — DIPHENHYDRAMINE HYDROCHLORIDE 50 MG/ML
12.5 INJECTION, SOLUTION INTRAMUSCULAR; INTRAVENOUS
Status: DISCONTINUED | OUTPATIENT
Start: 2017-05-24 | End: 2017-05-24 | Stop reason: HOSPADM

## 2017-05-24 RX ORDER — HYDROMORPHONE HYDROCHLORIDE 2 MG/ML
0.5 INJECTION, SOLUTION INTRAMUSCULAR; INTRAVENOUS; SUBCUTANEOUS
Status: DISCONTINUED | OUTPATIENT
Start: 2017-05-24 | End: 2017-05-24 | Stop reason: HOSPADM

## 2017-05-24 RX ORDER — NALOXONE HYDROCHLORIDE 0.4 MG/ML
0.4 INJECTION, SOLUTION INTRAMUSCULAR; INTRAVENOUS; SUBCUTANEOUS AS NEEDED
Status: DISCONTINUED | OUTPATIENT
Start: 2017-05-24 | End: 2017-05-25 | Stop reason: HOSPADM

## 2017-05-24 RX ORDER — OXYCODONE HYDROCHLORIDE 5 MG/1
10 TABLET ORAL
Status: DISCONTINUED | OUTPATIENT
Start: 2017-05-24 | End: 2017-05-24 | Stop reason: HOSPADM

## 2017-05-24 RX ORDER — PROPOFOL 10 MG/ML
INJECTION, EMULSION INTRAVENOUS AS NEEDED
Status: DISCONTINUED | OUTPATIENT
Start: 2017-05-24 | End: 2017-05-24 | Stop reason: HOSPADM

## 2017-05-24 RX ORDER — SODIUM CHLORIDE 0.9 % (FLUSH) 0.9 %
5-10 SYRINGE (ML) INJECTION AS NEEDED
Status: DISCONTINUED | OUTPATIENT
Start: 2017-05-24 | End: 2017-05-25 | Stop reason: HOSPADM

## 2017-05-24 RX ORDER — HYDROMORPHONE HYDROCHLORIDE 1 MG/ML
2 INJECTION, SOLUTION INTRAMUSCULAR; INTRAVENOUS; SUBCUTANEOUS
Status: DISCONTINUED | OUTPATIENT
Start: 2017-05-24 | End: 2017-05-25 | Stop reason: HOSPADM

## 2017-05-24 RX ORDER — HYDROCODONE BITARTRATE AND ACETAMINOPHEN 7.5; 325 MG/15ML; MG/15ML
10 SOLUTION ORAL
Status: DISCONTINUED | OUTPATIENT
Start: 2017-05-24 | End: 2017-05-25 | Stop reason: HOSPADM

## 2017-05-24 RX ORDER — HEPARIN SODIUM 5000 [USP'U]/ML
5000 INJECTION, SOLUTION INTRAVENOUS; SUBCUTANEOUS ONCE
Status: COMPLETED | OUTPATIENT
Start: 2017-05-24 | End: 2017-05-24

## 2017-05-24 RX ORDER — OXYCODONE HYDROCHLORIDE 5 MG/1
5 TABLET ORAL
Status: DISCONTINUED | OUTPATIENT
Start: 2017-05-24 | End: 2017-05-24 | Stop reason: HOSPADM

## 2017-05-24 RX ORDER — KETOROLAC TROMETHAMINE 30 MG/ML
INJECTION, SOLUTION INTRAMUSCULAR; INTRAVENOUS AS NEEDED
Status: DISCONTINUED | OUTPATIENT
Start: 2017-05-24 | End: 2017-05-24

## 2017-05-24 RX ADMIN — PROPOFOL 200 MG: 10 INJECTION, EMULSION INTRAVENOUS at 10:15

## 2017-05-24 RX ADMIN — CEFAZOLIN 3 G: 1 INJECTION, POWDER, FOR SOLUTION INTRAMUSCULAR; INTRAVENOUS; PARENTERAL at 10:26

## 2017-05-24 RX ADMIN — KETOROLAC TROMETHAMINE 30 MG: 30 INJECTION, SOLUTION INTRAMUSCULAR; INTRAVENOUS at 12:14

## 2017-05-24 RX ADMIN — NEOSTIGMINE METHYLSULFATE 3 MG: 1 INJECTION INTRAVENOUS at 12:14

## 2017-05-24 RX ADMIN — CEFAZOLIN 2 G: 1 INJECTION, POWDER, FOR SOLUTION INTRAMUSCULAR; INTRAVENOUS; PARENTERAL at 18:00

## 2017-05-24 RX ADMIN — ONDANSETRON 4 MG: 2 INJECTION INTRAMUSCULAR; INTRAVENOUS at 10:28

## 2017-05-24 RX ADMIN — SODIUM CHLORIDE, SODIUM LACTATE, POTASSIUM CHLORIDE, AND CALCIUM CHLORIDE 125 ML/HR: 600; 310; 30; 20 INJECTION, SOLUTION INTRAVENOUS at 14:00

## 2017-05-24 RX ADMIN — SODIUM CHLORIDE, SODIUM LACTATE, POTASSIUM CHLORIDE, AND CALCIUM CHLORIDE 100 ML/HR: 600; 310; 30; 20 INJECTION, SOLUTION INTRAVENOUS at 08:56

## 2017-05-24 RX ADMIN — ROCURONIUM BROMIDE 20 MG: 10 INJECTION, SOLUTION INTRAVENOUS at 10:47

## 2017-05-24 RX ADMIN — SODIUM CHLORIDE, SODIUM LACTATE, POTASSIUM CHLORIDE, AND CALCIUM CHLORIDE: 600; 310; 30; 20 INJECTION, SOLUTION INTRAVENOUS at 10:48

## 2017-05-24 RX ADMIN — ONDANSETRON 4 MG: 2 INJECTION INTRAMUSCULAR; INTRAVENOUS at 14:23

## 2017-05-24 RX ADMIN — FENTANYL CITRATE 100 MCG: 50 INJECTION, SOLUTION INTRAMUSCULAR; INTRAVENOUS at 10:15

## 2017-05-24 RX ADMIN — ROCURONIUM BROMIDE 50 MG: 10 INJECTION, SOLUTION INTRAVENOUS at 10:15

## 2017-05-24 RX ADMIN — FENTANYL CITRATE 50 MCG: 50 INJECTION, SOLUTION INTRAMUSCULAR; INTRAVENOUS at 12:00

## 2017-05-24 RX ADMIN — SODIUM CHLORIDE, SODIUM LACTATE, POTASSIUM CHLORIDE, AND CALCIUM CHLORIDE: 600; 310; 30; 20 INJECTION, SOLUTION INTRAVENOUS at 10:11

## 2017-05-24 RX ADMIN — EPHEDRINE SULFATE 25 MG: 50 INJECTION INTRAMUSCULAR; INTRAVENOUS; SUBCUTANEOUS at 12:52

## 2017-05-24 RX ADMIN — HYDROMORPHONE HYDROCHLORIDE 1 MG: 1 INJECTION, SOLUTION INTRAMUSCULAR; INTRAVENOUS; SUBCUTANEOUS at 14:23

## 2017-05-24 RX ADMIN — Medication 10 ML: at 14:00

## 2017-05-24 RX ADMIN — LIDOCAINE HYDROCHLORIDE 0.1 ML: 10 INJECTION, SOLUTION INFILTRATION; PERINEURAL at 08:56

## 2017-05-24 RX ADMIN — HYDROMORPHONE HYDROCHLORIDE 1 MG: 1 INJECTION, SOLUTION INTRAMUSCULAR; INTRAVENOUS; SUBCUTANEOUS at 20:33

## 2017-05-24 RX ADMIN — APREPITANT 40 MG: 40 CAPSULE ORAL at 08:48

## 2017-05-24 RX ADMIN — GLYCOPYRROLATE 0.4 MG: 0.2 INJECTION INTRAMUSCULAR; INTRAVENOUS at 12:14

## 2017-05-24 RX ADMIN — MIDAZOLAM HYDROCHLORIDE 2 MG: 1 INJECTION, SOLUTION INTRAMUSCULAR; INTRAVENOUS at 09:51

## 2017-05-24 RX ADMIN — DEXAMETHASONE SODIUM PHOSPHATE 10 MG: 4 INJECTION, SOLUTION INTRA-ARTICULAR; INTRALESIONAL; INTRAMUSCULAR; INTRAVENOUS; SOFT TISSUE at 10:28

## 2017-05-24 RX ADMIN — LIDOCAINE HYDROCHLORIDE 100 MG: 20 INJECTION, SOLUTION EPIDURAL; INFILTRATION; INTRACAUDAL; PERINEURAL at 10:15

## 2017-05-24 RX ADMIN — HEPARIN SODIUM 5000 UNITS: 5000 INJECTION, SOLUTION INTRAVENOUS; SUBCUTANEOUS at 09:16

## 2017-05-24 RX ADMIN — HEPARIN SODIUM 5000 UNITS: 5000 INJECTION, SOLUTION INTRAVENOUS; SUBCUTANEOUS at 20:33

## 2017-05-24 RX ADMIN — FENTANYL CITRATE 50 MCG: 50 INJECTION, SOLUTION INTRAMUSCULAR; INTRAVENOUS at 11:41

## 2017-05-24 RX ADMIN — ACETAMINOPHEN 1000 MG: 10 INJECTION, SOLUTION INTRAVENOUS at 12:15

## 2017-05-24 NOTE — PERIOP NOTES
TRANSFER - OUT REPORT:    Verbal report given to Dave Vaughn 70 on Elsariedgar Bustillos.  being transferred to [facility] for routine progression of care       Report consisted of patients Situation, Background, Assessment and   Recommendations(SBAR). Information from the following report(s) SBAR was reviewed with the receiving nurse. Lines:   Peripheral IV 05/24/17 Left Hand (Active)   Site Assessment Clean, dry, & intact 5/24/2017 12:37 PM   Phlebitis Assessment 0 5/24/2017 12:37 PM   Infiltration Assessment 0 5/24/2017 12:37 PM   Dressing Status Clean, dry, & intact 5/24/2017 12:37 PM   Dressing Type Tape;Transparent 5/24/2017 12:37 PM   Hub Color/Line Status Infusing 5/24/2017 12:37 PM        Opportunity for questions and clarification was provided.       Patient transported with:   O2 @ 2 liters

## 2017-05-24 NOTE — IP AVS SNAPSHOT
[address] [place] [address] 
[phone] Patient: Raquel Fu. MRN: [id number] OXN:[id number] You are allergic to the following No active allergies Recent Documentation Height Weight BMI Smoking Status 1.753 m 141.3 kg 46.02 kg/m2 Former Smoker Emergency Contacts Name Discharge Info Relation Home Work Mobile Zahra Camarena CAREGIVER [3] Spouse [3] [phone] About your hospitalization You were admitted on:  May 24, 2017 You last received care in the:  [address] You were discharged on:  May 25, 2017 Unit phone number:  [phone] Why you were hospitalized Your primary diagnosis was:  Not on File Your diagnoses also included: Morbid Obesity (Hcc) Providers Seen During Your Hospitalizations Provider Role Specialty Primary office phone Shruthi Torres MD Attending Provider General Surgery [phone] Your Primary Care Physician (PCP) Primary Care Physician Office Phone Office Fax Elvira Early [phone] Follow-up Information Follow up With Details Comments Contact Info Noe Dickinson MD   [address] [place] [address] 
[phone] Your Appointments Tuesday June 06, 2017 11:15 AM EDT  
GPO Devin Hardin with Shruthi Torres MD  
[facility] ([address] [place] [address]) [address] [place] [id number] [phone] Current Discharge Medication List  
  
CONTINUE these medications which have CHANGED Dose & Instructions Dispensing Information Comments Morning Noon Evening Bedtime ALPRAZolam 1 mg tablet Commonly known as:  Pama Syed What changed:  when to take this Dose:  1 mg Take 1 Tab by mouth two (2) times daily as needed for Anxiety. Max Daily Amount: 2 mg. Quantity:  180 Tab Refills:  0  
     
   
   
   
  
 aspirin 325 mg tablet Commonly known as:  ASPIRIN What changed:  Another medication with the same name was removed. Continue taking this medication, and follow the directions you see here. Your next dose is: Today Dose:  325 mg Take 325 mg by mouth every evening. Indications: myocardial infarction prevention, continue. was instructed to continue Refills:  0  
     
   
   
  
   
  
 carvedilol 6.25 mg tablet Commonly known as:  Jeanelljailyn Meline What changed:  additional instructions Your next dose is: Today Dose:  12.5 mg Take 2 Tabs by mouth two (2) times a day. Quantity:  360 Tab Refills:  1  
     
   
   
  
   
  
 citalopram 20 mg tablet Commonly known as:  Janet Davidson What changed:  when to take this Your next dose is:  Tomorrow Dose:  20 mg Take 1 Tab by mouth daily. Quantity:  90 Tab Refills:  1  
     
   
   
   
  
 omeprazole 20 mg capsule Commonly known as:  PRILOSEC What changed:   
- when to take this 
- additional instructions Your next dose is:  Tomorrow Dose:  20 mg Take 1 Cap by mouth daily. Indications: gastroesophageal reflux disease Quantity:  30 Cap Refills:  1 CONTINUE these medications which have NOT CHANGED Dose & Instructions Dispensing Information Comments Morning Noon Evening Bedtime  
 atorvastatin 10 mg tablet Commonly known as:  LIPITOR Dose:  10 mg Take 1 Tab by mouth daily. Quantity:  90 Tab Refills:  1  
     
   
   
   
  
 cpap machine kit  
   
 by Does Not Apply route. autopap 6-18cm Refills:  0  
     
   
   
   
  
 FISH OIL PO Take  by mouth every morning. Indications: hold until after surgery Refills:  0  
     
   
   
   
  
 lisinopril 10 mg tablet Commonly known as:  Liya Beasley  
   
 Dose:  10 mg Take 1 Tab by mouth daily. Quantity:  90 Tab Refills:  1  
     
   
   
   
  
 metFORMIN 500 mg tablet Commonly known as:  GLUCOPHAGE Dose:  1000 mg Take 2 Tabs by mouth two (2) times daily (with meals). Quantity:  360 Tab Refills:  1  
     
   
   
   
  
 multivitamin tablet Commonly known as:  ONE A DAY Dose:  1 Tab Take 1 Tab by mouth daily. Indications: hold until after surgery Refills:  0  
     
   
   
   
  
 nitroglycerin 0.4 mg SL tablet Commonly known as:  NITROSTAT Dose:  0.4 mg  
1 Tab by SubLINGual route every five (5) minutes as needed for Chest Pain. Indications: ANGINA Quantity:  25 Tab Refills:  11 VITAMIN B-12 500 mcg tablet Generic drug:  cyanocobalamin Dose:  500 mcg Take 500 mcg by mouth Every Mon, Wed & Sun. Indications: PREVENTION OF VITAMIN B12 DEFICIENCY Refills:  0 ASK your doctor about these medications Dose & Instructions Dispensing Information Comments Morning Noon Evening Bedtime  
 clopidogrel 75 mg Tab Commonly known as:  PLAVIX Dose:  75 mg Take 1 Tab by mouth daily. Quantity:  90 Tab Refills:  1 Discharge Instructions Calvin Quinn MD 
[facility], Endoscopy Director of Robotic Surgery [facility], [address] [facility], [place] [address] 
[phone] Congratulations on your recent Bariatric Surgery.  After the first week is over you will realize the easiest part of the process is over and the hardest is just beginning.  
The hard decisions of food sacrifices (not just cutting back) and exercise of a minimum 5 times per week for 45 minutes are required and not suggestions to ensure you lose the most weight you can and gain maximal health. Shyam Bliss that surgery never promises you that you will lose all the weight you need to and therefore you need to do your part. The things to work on in the next two weeks: 1.  Safety is our priority which includes calling if you have fevers of 101.0 or greater continued nausea and vomiting despite your best efforts to chew and slow down.  A racing heart rate, chest pain shortness of breath or any calf pain or lower leg swelling that is different anything you may have had in the past.   
 
2. Walking is one of the most important ways to prevent a blood clot in the legs or lungs or pneumonia. You did receive in hospital treatment with blood thinner injections, \"leg squeezers\", and early ambulation but walking up to four times a day even if it starts out inside the house is vital.  IF you were sent home on LOVENOX injections your risk was assessed at higher than the average for a bariatric patient and you should complete these injection as prescribed.  You should take the inspirometer you did your breathing exercises with home and use it at least four times at day to assess whether you are improving. You should work with it until you can achieve higher than 1500 ml each time. 3. Exercise should start with walking for the first 2 weeks and when I see you back we can release you to do more.  Remember the priority is to strengthen your heart for longevity and durability not to lift heavy weights for short periods of time.  You can do that too after 3 weeks too but cardio first for 45 minutes for 5 days per week as the ultimate goal.   
 
4. Sleep,  I suggest a recliner with an arm lever to help you get up or many pillows to prop you up because it is difficult to get out of a flat bed in the first few days.  It will also help those of you that have sleep apnea and don't routinely use your CPAP machine, although you should.   
 
5. Hydration,  Please drink 64 oz of NO SUGAR, NO CARBONATED fluid.  STOP do not \"TRY to avoid\".  It is the first step backwards that you can make.  Food is 80% water and now eating so little will be a set up for dehydration.  Monitor you urine output for darkness and frequency and let us know if these change. You may need IV fluids if this happens and we can arrange this as an outpatient if you can let us know early. Please STOP going through drive through food places if you did before.  Be very strict about the diet plan because you can cause serious problems with your surgery if you don't follow the stepwise plan.  NEVER ask when can I go back to NORMAL food.  The answer will always be NEVER if you want to lose a significant amount of weight and keep it off.   Remember the decision to have surgery is a decision to go 180 degrees in the opposite direction of what your were doing, you had surgery because what you were doing before DID NOT WORK.  Always know (plan ahead) WHAT you will eat the next day.  Otherwise you will go by \"what do I feel like eating' and that is also a step in the wrong direction.   
 
Remember from now on you are also learning to keep weight off by changing everything you have previously thought and did with food and exercise.  Only permanent changes with result in permanent weight loss. Mary Person you for choosing [facility].  I look forward to seeing you through this process and to a healthier life. Sincerely,  
Sonya Valencia MD 
 
This is as important of a life changing decision as getting  or having a child or starting a new job.  The long term results will only be as good as the strength and durability of the decision and actions that you make before you begin. Darren Nilesh your mind, then change your habits, and only then have surgery to help you reinforce those changes.  
   
 
 
 
 
DISCHARGE SUMMARY from Nurse The following personal items are in your possession at time of discharge: 
 
Dental Appliances: Uppers, Lowers Visual Aid: None PATIENT INSTRUCTIONS: 
 
After general anesthesia or intravenous sedation, for 24 hours or while taking prescription Narcotics: · Limit your activities · Do not drive and operate hazardous machinery · Do not make important personal or business decisions · Do  not drink alcoholic beverages · If you have not urinated within 8 hours after discharge, please contact your surgeon on call. Report the following to your surgeon: 
· Excessive pain, swelling, redness or odor of or around the surgical area · Temperature over 100.5 · Nausea and vomiting lasting longer than 4 hours or if unable to take medications · Any signs of decreased circulation or nerve impairment to extremity: change in color, persistent  numbness, tingling, coldness or increase pain · Any questions What to do at Home: 
Recommended activity: Activity as tolerated, per MD 
 
 
*  Please give a list of your current medications to your Primary Care Provider. *  Please update this list whenever your medications are discontinued, doses are 
    changed, or new medications (including over-the-counter products) are added. *  Please carry medication information at all times in case of emergency situations. These are general instructions for a healthy lifestyle: No smoking/ No tobacco products/ Avoid exposure to second hand smoke Surgeon General's Warning:  Quitting smoking now greatly reduces serious risk to your health. Obesity, smoking, and sedentary lifestyle greatly increases your risk for illness A healthy diet, regular physical exercise & weight monitoring are important for maintaining a healthy lifestyle You may be retaining fluid if you have a history of heart failure or if you experience any of the following symptoms:  Weight gain of 3 pounds or more overnight or 5 pounds in a week, increased swelling in our hands or feet or shortness of breath while lying flat in bed.   Please call your doctor as soon as you notice any of these symptoms; do not wait until your next office visit. Recognize signs and symptoms of STROKE: 
 
F-face looks uneven A-arms unable to move or move unevenly S-speech slurred or non-existent T-time-call 911 as soon as signs and symptoms begin-DO NOT go Back to bed or wait to see if you get better-TIME IS BRAIN. Warning Signs of HEART ATTACK Call 911 if you have these symptoms: 
? Chest discomfort. Most heart attacks involve discomfort in the center of the chest that lasts more than a few minutes, or that goes away and comes back. It can feel like uncomfortable pressure, squeezing, fullness, or pain. ? Discomfort in other areas of the upper body. Symptoms can include pain or discomfort in one or both arms, the back, neck, jaw, or stomach. ? Shortness of breath with or without chest discomfort. ? Other signs may include breaking out in a cold sweat, nausea, or lightheadedness. Don't wait more than five minutes to call [address]! Fast action can save your life. Calling 911 is almost always the fastest way to get lifesaving treatment. Emergency Medical Services staff can begin treatment when they arrive  up to an hour sooner than if someone gets to the hospital by car. The discharge information has been reviewed with the {PATIENT PARENT GUARDIAN:58274}. The {PATIENT PARENT GUARDIAN:77811} verbalized understanding. Discharge medications reviewed with the {Dishcarge meds reviewed NVKM:57829} and appropriate educational materials and side effects teaching were provided. Discharge Orders None Introducing [facility]! Srinivasa Smith introduces [employer] patient portal. Now you can access parts of your medical record, email your doctor's office, and request medication refills online. 1. In your internet browser, go to [email or web address] 2. Click on the First Time User? Click Here link in the Sign In box. You will see the New Member Sign Up page. 3. Enter your [employer] Access Code exactly as it appears below. You will not need to use this code after youve completed the sign-up process. If you do not sign up before the expiration date, you must request a new code. · [employer] Access Code: [email or web address] Expires: 7/29/2017  5:43 PM 
 
4. Enter the last four digits of your Social Security Number (xxxx) and Date of Birth (mm/dd/yyyy) as indicated and click Submit. You will be taken to the next sign-up page. 5. Create a [employer] ID. This will be your [employer] login ID and cannot be changed, so think of one that is secure and easy to remember. 6. Create a [employer] password. You can change your password at any time. 7. Enter your Password Reset Question and Answer. This can be used at a later time if you forget your password. 8. Enter your e-mail address. You will receive e-mail notification when new information is available in [address] 9. Click Sign Up. You can now view and download portions of your medical record. 10. Click the Download Summary menu link to download a portable copy of your medical information. If you have questions, please visit the Frequently Asked Questions section of the [employer] website. Remember, [employer] is NOT to be used for urgent needs. For medical emergencies, dial 911. Now available from your iPhone and Android! General Information Please provide this summary of care documentation to your next provider. Patient Signature:  ____________________________________________________________ Date:  ____________________________________________________________  
  
Leocadia Nim Provider Signature:  ____________________________________________________________ Date:  ____________________________________________________________

## 2017-05-24 NOTE — ANESTHESIA POSTPROCEDURE EVALUATION
Post-Anesthesia Evaluation and Assessment    Patient: Agueda Campos MRN: [id number]  SSN: [id number]    YOB: 1955  Age: 62 y.o. Sex: male       Cardiovascular Function/Vital Signs  Visit Vitals    /79    Pulse (!) 52    Temp 36.5 °C (97.7 °F)    Resp 18    Ht 5' 9\" (1.753 m)    Wt 141.3 kg (311 lb 9.6 oz)    SpO2 92%    BMI 46.02 kg/m2       Patient is status post general anesthesia for Procedure(s):  ROBOTIC ASSISTED SLEEVE GASTRECTOMY  ESOPHAGOGASTRODUODENOSCOPY (EGD). Nausea/Vomiting: None    Postoperative hydration reviewed and adequate. Pain:  Pain Scale 1: Numeric (0 - 10) (05/24/17 1319)  Pain Intensity 1: 0 (05/24/17 1319)   Managed    Neurological Status:   Neuro (WDL): Exceptions to WDL (05/24/17 1319)  Neuro  Neurologic State: Drowsy (05/24/17 1319)  Orientation Level: Oriented X4 (05/24/17 1319)  Cognition: Follows commands (05/24/17 1319)  Speech: Clear (05/24/17 1319)  LUE Motor Response: Purposeful (05/24/17 1319)  LLE Motor Response: Purposeful (05/24/17 1319)  RUE Motor Response: Purposeful (05/24/17 1319)  RLE Motor Response: Purposeful (05/24/17 1319)   At baseline    Mental Status and Level of Consciousness: Arousable    Pulmonary Status:   O2 Device: Nasal cannula (05/24/17 1319)   Adequate oxygenation and airway patent    Complications related to anesthesia: None    Post-anesthesia assessment completed.  No concerns    Signed By: Tasha Naqvi MD     May 24, 2017

## 2017-05-24 NOTE — PROGRESS NOTES
Pt assessment completed. Alert and oriented. Lungs CTA diminished in bases with even and unlabored respirations. Heart sounds regular. Abdomen soft, obese and tender with active bowel sounds. IV present and infusing without difficulty. 5 PS noted on abdomen with derma bond, clean dry and intact. Pt oriented to room, aware to call for assistance. All needs met at this time, will continue to monitor.

## 2017-05-24 NOTE — ANESTHESIA PREPROCEDURE EVALUATION
Anesthetic History               Review of Systems / Medical History  Patient summary reviewed, nursing notes reviewed and pertinent labs reviewed    Pulmonary        Sleep apnea: CPAP  Smoker (former 90 pack years)         Neuro/Psych              Cardiovascular    Hypertension          Past MI (x2), CAD and cardiac stents (x8 - last '04)  Pertinent negatives: No angina  Exercise tolerance: >4 METS  Comments:  On Plavix - last dose 5d ago - 325mg ASA last night   GI/Hepatic/Renal                Endo/Other    Diabetes: type 2    Morbid obesity     Other Findings              Physical Exam    Airway  Mallampati: III  TM Distance: > 6 cm  Neck ROM: normal range of motion   Mouth opening: Normal     Cardiovascular    Rhythm: regular  Rate: normal         Dental    Dentition: Full lower dentures and Full upper dentures     Pulmonary  Breath sounds clear to auscultation               Abdominal         Other Findings            Anesthetic Plan    ASA: 3  Anesthesia type: general          Induction: Intravenous  Anesthetic plan and risks discussed with: Patient

## 2017-05-24 NOTE — OP NOTES
Calvin Keenan MD  [facility]  [address]  [place]   [phone]      Operative Report    Patient: Nerissa Harris. MRN: [id number]  SSN: [id number]    YOB: 1955  Age: 62 y.o. Sex: male       Date of Surgery: 5/24/2017     Preoperative Diagnosis: Morbid obesity, unspecified obesity type [E66.01]     Postoperative Diagnosis: Morbid obesity, unspecified obesity type [E66.01]   BMI:   Body mass index is 46.02 kg/(m^2). Procedure: Procedure(s):  ROBOTIC ASSISTED SLEEVE GASTRECTOMY  ESOPHAGOGASTRODUODENOSCOPY (EGD)  Anesthesia: General   Complications: none  Estimated Blood Loss: per anesthesia  Indications:  As outlined in the History and Physical.    HISTORY: This is a 62 y.o. male who came to the surgical weight loss  clinic at [facility] for consideration of bariatric surgery. The patient went to an information session, met with the dietician and psychologist. he came in and I discussed a gastric bypass versus a sleeve gastrectomy with the patient. I recommended a sleeve gastrectomy. The  patient agreed and signed a consent form. For risks, I mentioned risks of bleeding, infection, anesthesia, injury to the esophagus, stomach, small bowel, liver, spleen, large bowel. I also went through risks of myocardial infarction, pneumonia and leak from the staple line of the sleeve gastrectomy. I said that a leak could produce peritonitis, multi-system organ failure, and even death. There is a 1% nationwide mortality rate for this procedure. The patient understood and wished to proceed. PROCEDURE IN DETAIL: The patient was brought to an operating room at the [address] [place] [address] where general endotracheal anesthesia was administered without complications. She received 2 grams of Ancef as prophylactic antibiotic coverage.  The nursing staff applied sequential compression devices, no gauthier catheter was used. The abdomen was prepped and draped in the usual sterile manner. I docked the [employer] system safely to all the ports with the 3rd arm on the patient left side. Once all arms were attached and I ensured that there was minimal chance of collisions and that it safely cleared the patient I inserted the camera. An incision was made superior to the umbilicus for 12 mm and was later converted into a 15 mm trocar for the stapling. An Optiview trocar was placed in the peritoneal cavity under direct vision and then a 30 degree 10 mm scope was inserted through it. Once in the peritoneal cavity, the abdomen was insufflated to 15 mmHg using carbon dioxide gas. The table was placed in reverse Trendelenburg position. Three 8 mm trocars were placed one in the right upper quadrant under direct vision. Another 8 mm trocar in the left mid abdomen and one in the left lateral abdomen. Through a 5 mm incision in the epigastrium, a Osmany liver retractor was placed and used to retract the left lobe of the liver throughout the remainder of the procedure. It was attached to the bed attachment self-retaining retracting device. I identified the pylorus and measured 6 cm proximal to the pylorus in an area along the greater curvature. The nurse anesthetist placed the 38 Malaysian bougie under direct vision previously and I confirmed its position. Laparosocopically I made a small aperture with the DaVinci heat sealing device. I then took the gastrocolic ligament from this point all the way up to the left ekta of the diaphragm. I cleared off some posterior adhesions with the device as well. I  the GC ligament inferior until it was 6 cm away from the pylorus. I then confirmed the position of the bougie once again in the antrum. I chose to use the first and second cartridge as a black staple cartridge loaded with seam guard.  The next  staple cartridges were purple also loaded with seam guard, so as to divide the stomach along the calibration tube. I carefully formed the staple line under clear vision without any twisting or kinking of the closure. The bougie tube was removed and crossing staple lines as well as the staple line near the angle of His were reinforced with clips as well. I used the pre-fired endoGIA stapler to occlude the distal sleeve. There was no evidence of any bleeding along the staple line. I scrubbed out and tested the anastomosis with an intraoperative EGD. With a jaw thrust by Anesthesia the endoscope was passed easily without resistance. Under direct laparoscopic and endoscopic vision, I performed a leak test with under water submersion. No bubbles, strictures or obstruction was identified and the endoscope removed. The endoGIA was also unclamped and removed. I scrubbed back in to finish the case. We checked the staple line again and there was no evidence of bleeding. I removed the partial gastrectomy specimen from the dilated 15 mm port after closing it with an 0-Vicryl suture. I removed all the trocars. The skin was closed with subcuticular sutures of 4-0 Vicryl. The patient received 30 mL 0.5% Marcaine in 5 mL doses to each incision site. Dermabond was used on all the incisions for final dressing and closure. The patient tolerated procedure well. She was extubated and brought to the recovery room in stable condition.     Maryse Arana MD

## 2017-05-24 NOTE — PROGRESS NOTES
Pt c/o 8/10 pain and nausea administered dilaudid 1mg IV per MD order and zofran 4mg IV per MD order, will continue to monitor

## 2017-05-24 NOTE — PROGRESS NOTES
TRANSFER - IN REPORT:    Verbal report received from [facility]) on Tammie Chavez.  being received from PACU(unit) for routine post - op      Report consisted of patients Situation, Background, Assessment and   Recommendations(SBAR). Information from the following report(s) SBAR, Kardex, OR Summary, Intake/Output, MAR and Recent Results was reviewed with the receiving nurse. Opportunity for questions and clarification was provided. Assessment completed upon patients arrival to unit and care assumed.

## 2017-05-25 VITALS
WEIGHT: 311.6 LBS | RESPIRATION RATE: 18 BRPM | BODY MASS INDEX: 46.15 KG/M2 | TEMPERATURE: 97.9 F | OXYGEN SATURATION: 95 % | HEART RATE: 54 BPM | SYSTOLIC BLOOD PRESSURE: 125 MMHG | DIASTOLIC BLOOD PRESSURE: 70 MMHG | HEIGHT: 69 IN

## 2017-05-25 LAB
ANION GAP BLD CALC-SCNC: 13 MMOL/L (ref 7–16)
BASOPHILS # BLD AUTO: 0 K/UL (ref 0–0.2)
BASOPHILS # BLD: 0 % (ref 0–2)
BUN SERPL-MCNC: 11 MG/DL (ref 8–23)
CALCIUM SERPL-MCNC: 8.7 MG/DL (ref 8.3–10.4)
CHLORIDE SERPL-SCNC: 104 MMOL/L (ref 98–107)
CO2 SERPL-SCNC: 25 MMOL/L (ref 21–32)
CREAT SERPL-MCNC: 0.76 MG/DL (ref 0.8–1.5)
DIFFERENTIAL METHOD BLD: ABNORMAL
EOSINOPHIL # BLD: 0 K/UL (ref 0–0.8)
EOSINOPHIL NFR BLD: 1 % (ref 0.5–7.8)
ERYTHROCYTE [DISTWIDTH] IN BLOOD BY AUTOMATED COUNT: 13.9 % (ref 11.9–14.6)
GLUCOSE SERPL-MCNC: 156 MG/DL (ref 65–100)
HCT VFR BLD AUTO: 39.5 % (ref 41.1–50.3)
HGB BLD-MCNC: 13.1 G/DL (ref 13.6–17.2)
IMM GRANULOCYTES # BLD: 0 K/UL (ref 0–0.5)
IMM GRANULOCYTES NFR BLD AUTO: 0.4 % (ref 0–5)
LYMPHOCYTES # BLD AUTO: 17 % (ref 13–44)
LYMPHOCYTES # BLD: 1.4 K/UL (ref 0.5–4.6)
MCH RBC QN AUTO: 30.3 PG (ref 26.1–32.9)
MCHC RBC AUTO-ENTMCNC: 33.2 G/DL (ref 31.4–35)
MCV RBC AUTO: 91.2 FL (ref 79.6–97.8)
MONOCYTES # BLD: 0.6 K/UL (ref 0.1–1.3)
MONOCYTES NFR BLD AUTO: 7 % (ref 4–12)
NEUTS SEG # BLD: 6.2 K/UL (ref 1.7–8.2)
NEUTS SEG NFR BLD AUTO: 75 % (ref 43–78)
PLATELET # BLD AUTO: 194 K/UL (ref 150–450)
PMV BLD AUTO: 10.7 FL (ref 10.8–14.1)
POTASSIUM SERPL-SCNC: 3.5 MMOL/L (ref 3.5–5.1)
RBC # BLD AUTO: 4.33 M/UL (ref 4.23–5.67)
SODIUM SERPL-SCNC: 142 MMOL/L (ref 136–145)
WBC # BLD AUTO: 8.3 K/UL (ref 4.3–11.1)

## 2017-05-25 PROCEDURE — 74011250637 HC RX REV CODE- 250/637: Performed by: SURGERY

## 2017-05-25 PROCEDURE — 80048 BASIC METABOLIC PNL TOTAL CA: CPT | Performed by: SURGERY

## 2017-05-25 PROCEDURE — 74011250636 HC RX REV CODE- 250/636: Performed by: SURGERY

## 2017-05-25 PROCEDURE — 36415 COLL VENOUS BLD VENIPUNCTURE: CPT | Performed by: SURGERY

## 2017-05-25 PROCEDURE — 85025 COMPLETE CBC W/AUTO DIFF WBC: CPT | Performed by: SURGERY

## 2017-05-25 RX ORDER — CARVEDILOL 6.25 MG/1
12.5 TABLET ORAL 2 TIMES DAILY
Status: DISCONTINUED | OUTPATIENT
Start: 2017-05-25 | End: 2017-05-25 | Stop reason: HOSPADM

## 2017-05-25 RX ADMIN — HEPARIN SODIUM 5000 UNITS: 5000 INJECTION, SOLUTION INTRAVENOUS; SUBCUTANEOUS at 11:46

## 2017-05-25 RX ADMIN — CEFAZOLIN 2 G: 1 INJECTION, POWDER, FOR SOLUTION INTRAMUSCULAR; INTRAVENOUS; PARENTERAL at 01:18

## 2017-05-25 RX ADMIN — KETOROLAC TROMETHAMINE 30 MG: 30 INJECTION, SOLUTION INTRAMUSCULAR at 10:55

## 2017-05-25 RX ADMIN — HYDROMORPHONE HYDROCHLORIDE 1 MG: 1 INJECTION, SOLUTION INTRAMUSCULAR; INTRAVENOUS; SUBCUTANEOUS at 07:41

## 2017-05-25 RX ADMIN — HYDROCODONE BITARTRATE AND ACETAMINOPHEN 10 MG: 7.5; 325 SOLUTION ORAL at 04:07

## 2017-05-25 RX ADMIN — HYDROMORPHONE HYDROCHLORIDE 1 MG: 1 INJECTION, SOLUTION INTRAMUSCULAR; INTRAVENOUS; SUBCUTANEOUS at 01:20

## 2017-05-25 RX ADMIN — HEPARIN SODIUM 5000 UNITS: 5000 INJECTION, SOLUTION INTRAVENOUS; SUBCUTANEOUS at 03:50

## 2017-05-25 RX ADMIN — SODIUM CHLORIDE, SODIUM LACTATE, POTASSIUM CHLORIDE, AND CALCIUM CHLORIDE 125 ML/HR: 600; 310; 30; 20 INJECTION, SOLUTION INTRAVENOUS at 01:00

## 2017-05-25 NOTE — PROGRESS NOTES
Nutrition Assessment:  Anthropometrics: Ht: 5'9\", Wt: 316#, 198 %IBW, 46.8 BMI  Co-morbidities: MIA, DM, HTN, hyperlipidemia, CAD  Nutrition Diagnosis:   Altered GI function R/T wt loss surgery as evidenced by s/p VSG. Morbid obesity as evidenced by 198 %IBW and 46.8 BMI. Intervention:   1. Pt visited by bariatric RD. Pt tolerating clear liquid diet and acknowledges the need for OOB movement. 2. RD answered pt questions and emphasized 1. Hydration 2. Protein 3. Movement. Monitoring and Evaluation:  1. Follow for tolerance of small amounts of non-carbonated, no concentrated sweet clear liquids while admitted. 2. Follow for weight loss per bariatric guidelines. 3. Bariatric RD to f/u as outpatient.      Axel Orourke RD, LD

## 2017-05-25 NOTE — PROGRESS NOTES
Per Dr Mary Skinner pt may stay today. He is going to check in with the pt this afternoon. I will defer d/c instructions until then.

## 2017-05-25 NOTE — DISCHARGE INSTRUCTIONS
Calvin Prieto MD  [address] [facility]   Director of Robotic Surgery  [place], [place]   [phone]      Congratulations on your recent Bariatric Surgery.  After the first week is over you will realize the easiest part of the process is over and the hardest is just beginning. The hard decisions of food sacrifices (not just cutting back) and exercise of a minimum 5 times per week for 45 minutes are required and not suggestions to ensure you lose the most weight you can and gain maximal health. Del Cheondoism that surgery never promises you that you will lose all the weight you need to and therefore you need to do your part. The things to work on in the next two weeks:    1.  Safety is our priority which includes calling if you have fevers of 101.0 or greater continued nausea and vomiting despite your best efforts to chew and slow down.  A racing heart rate, chest pain shortness of breath or any calf pain or lower leg swelling that is different anything you may have had in the past.      2. Walking is one of the most important ways to prevent a blood clot in the legs or lungs or pneumonia. You did receive in hospital treatment with blood thinner injections, \"leg squeezers\", and early ambulation but walking up to four times a day even if it starts out inside the house is vital.  IF you were sent home on LOVENOX injections your risk was assessed at higher than the average for a bariatric patient and you should complete these injection as prescribed.  You should take the inspirometer you did your breathing exercises with home and use it at least four times at day to assess whether you are improving. You should work with it until you can achieve higher than 1500 ml each time.     3. Exercise should start with walking for the first 2 weeks and when I see you back we can release you to do more.  Remember the priority is to strengthen your heart for longevity and durability not to lift heavy weights for short periods of time.  You can do that too after 3 weeks too but cardio first for 45 minutes for 5 days per week as the ultimate goal.      4. Sleep,  I suggest a recliner with an arm lever to help you get up or many pillows to prop you up because it is difficult to get out of a flat bed in the first few days.  It will also help those of you that have sleep apnea and don't routinely use your CPAP machine, although you should.      5. Hydration,  Please drink 64 oz of NO SUGAR, NO CARBONATED fluid.  STOP do not \"TRY to avoid\".  It is the first step backwards that you can make.  Food is 80% water and now eating so little will be a set up for dehydration.  Monitor you urine output for darkness and frequency and let us know if these change. You may need IV fluids if this happens and we can arrange this as an outpatient if you can let us know early.     Please STOP going through drive through food places if you did before.  Be very strict about the diet plan because you can cause serious problems with your surgery if you don't follow the stepwise plan.  NEVER ask when can I go back to NORMAL food.  The answer will always be NEVER if you want to lose a significant amount of weight and keep it off.   Remember the decision to have surgery is a decision to go 180 degrees in the opposite direction of what your were doing, you had surgery because what you were doing before DID NOT WORK.  Always know (plan ahead) WHAT you will eat the next day.  Otherwise you will go by \"what do I feel like eating' and that is also a step in the wrong direction.      Remember from now on you are also learning to keep weight off by changing everything you have previously thought and did with food and exercise.  Only permanent changes with result in permanent weight loss. Margaretha Dubin you for choosing [facility].  I look forward to seeing you through this process and to a healthier life. Sincerely,   Maryse Arana MD    This is as important of a life changing decision as getting  or having a child or starting a new job.  The long term results will only be as good as the strength and durability of the decision and actions that you make before you begin. Bruno Garrard your mind, then change your habits, and only then have surgery to help you reinforce those changes.               DISCHARGE SUMMARY from Nurse    The following personal items are in your possession at time of discharge:    Dental Appliances: Uppers, Lowers  Visual Aid: None                            PATIENT INSTRUCTIONS:    After general anesthesia or intravenous sedation, for 24 hours or while taking prescription Narcotics:  · Limit your activities  · Do not drive and operate hazardous machinery  · Do not make important personal or business decisions  · Do  not drink alcoholic beverages  · If you have not urinated within 8 hours after discharge, please contact your surgeon on call. Report the following to your surgeon:  · Excessive pain, swelling, redness or odor of or around the surgical area  · Temperature over 100.5  · Nausea and vomiting lasting longer than 4 hours or if unable to take medications  · Any signs of decreased circulation or nerve impairment to extremity: change in color, persistent  numbness, tingling, coldness or increase pain  · Any questions        What to do at Home:  Recommended activity: Activity as tolerated, per MD      *  Please give a list of your current medications to your Primary Care Provider. *  Please update this list whenever your medications are discontinued, doses are      changed, or new medications (including over-the-counter products) are added. *  Please carry medication information at all times in case of emergency situations.           These are general instructions for a healthy lifestyle:    No smoking/ No tobacco products/ Avoid exposure to second hand smoke    Surgeon General's Warning:  Quitting smoking now greatly reduces serious risk to your health. Obesity, smoking, and sedentary lifestyle greatly increases your risk for illness    A healthy diet, regular physical exercise & weight monitoring are important for maintaining a healthy lifestyle    You may be retaining fluid if you have a history of heart failure or if you experience any of the following symptoms:  Weight gain of 3 pounds or more overnight or 5 pounds in a week, increased swelling in our hands or feet or shortness of breath while lying flat in bed. Please call your doctor as soon as you notice any of these symptoms; do not wait until your next office visit. Recognize signs and symptoms of STROKE:    F-face looks uneven    A-arms unable to move or move unevenly    S-speech slurred or non-existent    T-time-call 911 as soon as signs and symptoms begin-DO NOT go       Back to bed or wait to see if you get better-TIME IS BRAIN. Warning Signs of HEART ATTACK     Call 911 if you have these symptoms:   Chest discomfort. Most heart attacks involve discomfort in the center of the chest that lasts more than a few minutes, or that goes away and comes back. It can feel like uncomfortable pressure, squeezing, fullness, or pain.  Discomfort in other areas of the upper body. Symptoms can include pain or discomfort in one or both arms, the back, neck, jaw, or stomach.  Shortness of breath with or without chest discomfort.  Other signs may include breaking out in a cold sweat, nausea, or lightheadedness. Don't wait more than five minutes to call 911 - MINUTES MATTER! Fast action can save your life. Calling 911 is almost always the fastest way to get lifesaving treatment. Emergency Medical Services staff can begin treatment when they arrive -- up to an hour sooner than if someone gets to the hospital by car.        The discharge information has been reviewed with the patient. The patient verbalized understanding. Discharge medications reviewed with the patient and appropriate educational materials and side effects teaching were provided.

## 2017-05-25 NOTE — PROGRESS NOTES
Shift assessment complete; pt resting in bed. Alert & oriented X4. States his pain after having Dilaudid is stating to decrease & is tolerable at this time. Resp even & unlabored on room air; lungs clear bilat. HR regular; S1, S2 auscultated. Abdomen obese, semi-soft & tender with hypoactive bowel sounds X4 quads. 5 lap sites with dermabond c/d/i with some redness noted around the edges. SCD's on. IV patent & infusing IV fluids. Encouraged pt to ambulate & use incentive spirometer. Call light in reach; bed low & locked; will continue to monitor.

## 2017-05-25 NOTE — PROGRESS NOTES
Discharge instructions and prescriptions provided and explained to the pt. Med side effect sheet reviewed. Opportunity for questions provided. Pt waiting on a ride. Instructed to call once ready to leave.

## 2017-05-25 NOTE — PROGRESS NOTES
Bariatric Surgery Progress Note    5/25/2017    Admit Date: 5/24/2017    Subjective:     Surgery POD #1  No nausea or vomiting. Patient states he is having some upper abdominal discomfort and is beginning to work on sipping liquids. He is anticipating going home tomorrow. Objective:     Visit Vitals    /65 (BP 1 Location: Right arm, BP Patient Position: At rest)    Pulse (!) 54    Temp 97.8 °F (36.6 °C)    Resp 16    Ht 5' 9\" (1.753 m)    Wt 141.3 kg (311 lb 9.6 oz)    SpO2 94%    BMI 46.02 kg/m2         Intake/Output Summary (Last 24 hours) at 05/25/17 3911  Last data filed at 05/24/17 1207   Gross per 24 hour   Intake             1500 ml   Output               50 ml   Net             1450 ml            Data Review    Recent Results (from the past 24 hour(s))   METABOLIC PANEL, BASIC    Collection Time: 05/25/17  5:20 AM   Result Value Ref Range    Sodium 142 136 - 145 mmol/L    Potassium 3.5 3.5 - 5.1 mmol/L    Chloride 104 98 - 107 mmol/L    CO2 25 21 - 32 mmol/L    Anion gap 13 7 - 16 mmol/L    Glucose 156 (H) 65 - 100 mg/dL    BUN 11 8 - 23 MG/DL    Creatinine 0.76 (L) 0.8 - 1.5 MG/DL    GFR est AA >60 >60 ml/min/1.73m2    GFR est non-AA >60 >60 ml/min/1.73m2    Calcium 8.7 8.3 - 10.4 MG/DL   CBC WITH AUTOMATED DIFF    Collection Time: 05/25/17  5:20 AM   Result Value Ref Range    WBC 8.3 4.3 - 11.1 K/uL    RBC 4.33 4.23 - 5.67 M/uL    HGB 13.1 (L) 13.6 - 17.2 g/dL    HCT 39.5 (L) 41.1 - 50.3 %    MCV 91.2 79.6 - 97.8 FL    MCH 30.3 26.1 - 32.9 PG    MCHC 33.2 31.4 - 35.0 g/dL    RDW 13.9 11.9 - 14.6 %    PLATELET 652 446 - 204 K/uL    MPV 10.7 (L) 10.8 - 14.1 FL    DF AUTOMATED      NEUTROPHILS 75 43 - 78 %    LYMPHOCYTES 17 13 - 44 %    MONOCYTES 7 4.0 - 12.0 %    EOSINOPHILS 1 0.5 - 7.8 %    BASOPHILS 0 0.0 - 2.0 %    IMMATURE GRANULOCYTES 0.4 0.0 - 5.0 %    ABS. NEUTROPHILS 6.2 1.7 - 8.2 K/UL    ABS. LYMPHOCYTES 1.4 0.5 - 4.6 K/UL    ABS. MONOCYTES 0.6 0.1 - 1.3 K/UL    ABS.  EOSINOPHILS 0.0 0.0 - 0.8 K/UL    ABS. BASOPHILS 0.0 0.0 - 0.2 K/UL    ABS. IMM. GRANS. 0.0 0.0 - 0.5 K/UL        Hospital Problems  Date Reviewed: 4/17/2017          Codes Class Noted POA    Morbid obesity ([facility] [address] (Chronic) ICD-10-CM: E66.01  ICD-9-CM: 278.01  1/6/2015 Unknown              Probable discharge home tomorrow. Reviewed home instructions for hydration, protein intake, and activity restrictions. Reviewed rules for PO intake. Has follow up appointment in office in 2 weeks.       Yesica Gutierrez RN

## 2017-05-25 NOTE — PROGRESS NOTES
Shift assessment complete. Pt alert and oriented. Lung sounds clear/diminished bilaterally. Abdomen soft, tender with hypoactive bowel sounds x 4. 5 ps to abdomen c/d/i. Pt concerned about taking a couple of his home medicines. Nurse will call on call doctor to ask if ok that he takes. No other needs voiced at this time. All safety measures in place.

## 2017-05-25 NOTE — PROGRESS NOTES
Dilaudid 1mg given slow iv push. Pain 8 on pain scale. Not passing gas yet, feels like it may be soon per patient. Encouraged to ambulate as much as possible.

## 2017-05-25 NOTE — PROGRESS NOTES
Admit Date: 2017    POD 1 Day Post-Op    Procedure:  Procedure(s):  ROBOTIC ASSISTED SLEEVE GASTRECTOMY  ESOPHAGOGASTRODUODENOSCOPY (EGD)    Subjective:     Patient has no new complaints. Some nausea and no vomiting. She didn't get a lot of sleep. We discussed discharge and reasons to stay. Objective:     Blood pressure 114/65, pulse (!) 54, temperature 97.8 °F (36.6 °C), resp. rate 16, height 5' 9\" (1.753 m), weight 311 lb 9.6 oz (141.3 kg), SpO2 94 %. Temp (24hrs), Av.6 °F (36.4 °C), Min:96.4 °F (35.8 °C), Max:98.6 °F (37 °C)      Physical Exam:  LUNG: clear to auscultation bilaterally,  CV RRR, Abdomen:  Soft incision clean and dry. Incisional tenderness. Labs:   Recent Results (from the past 24 hour(s))   METABOLIC PANEL, BASIC    Collection Time: 17  5:20 AM   Result Value Ref Range    Sodium 142 136 - 145 mmol/L    Potassium 3.5 3.5 - 5.1 mmol/L    Chloride 104 98 - 107 mmol/L    CO2 25 21 - 32 mmol/L    Anion gap 13 7 - 16 mmol/L    Glucose 156 (H) 65 - 100 mg/dL    BUN 11 8 - 23 MG/DL    Creatinine 0.76 (L) 0.8 - 1.5 MG/DL    GFR est AA >60 >60 ml/min/1.73m2    GFR est non-AA >60 >60 ml/min/1.73m2    Calcium 8.7 8.3 - 10.4 MG/DL   CBC WITH AUTOMATED DIFF    Collection Time: 17  5:20 AM   Result Value Ref Range    WBC 8.3 4.3 - 11.1 K/uL    RBC 4.33 4.23 - 5.67 M/uL    HGB 13.1 (L) 13.6 - 17.2 g/dL    HCT 39.5 (L) 41.1 - 50.3 %    MCV 91.2 79.6 - 97.8 FL    MCH 30.3 26.1 - 32.9 PG    MCHC 33.2 31.4 - 35.0 g/dL    RDW 13.9 11.9 - 14.6 %    PLATELET 014 950 - 837 K/uL    MPV 10.7 (L) 10.8 - 14.1 FL    DF AUTOMATED      NEUTROPHILS 75 43 - 78 %    LYMPHOCYTES 17 13 - 44 %    MONOCYTES 7 4.0 - 12.0 %    EOSINOPHILS 1 0.5 - 7.8 %    BASOPHILS 0 0.0 - 2.0 %    IMMATURE GRANULOCYTES 0.4 0.0 - 5.0 %    ABS. NEUTROPHILS 6.2 1.7 - 8.2 K/UL    ABS. LYMPHOCYTES 1.4 0.5 - 4.6 K/UL    ABS. MONOCYTES 0.6 0.1 - 1.3 K/UL    ABS. EOSINOPHILS 0.0 0.0 - 0.8 K/UL    ABS.  BASOPHILS 0.0 0.0 - 0.2 K/UL    ABS. IMM. GRANS. 0.0 0.0 - 0.5 K/UL       Data Review labs acceptable    Assessment:     Active Problems: Morbid obesity ([facility] [address] (1/6/2015)        Plan/Recommendations/Medical Decision Making:     She is nauseous but still wants to go home. I told her to see how it goes today and if she feels better this afternoon she can go. Her  and her will discuss it. He was present for this discussion as well. When nausea resolves she can go home today.     Inga Chacon MD

## 2018-02-26 PROBLEM — Z01.810 PREOP CARDIOVASCULAR EXAM: Status: RESOLVED | Noted: 2017-03-16 | Resolved: 2018-02-26

## 2018-03-01 PROBLEM — E66.01 OBESITY, MORBID (HCC): Status: ACTIVE | Noted: 2018-03-01

## 2021-04-07 PROBLEM — G47.8 NOCTURNAL SLEEP-RELATED EATING DISORDER: Status: ACTIVE | Noted: 2021-04-07

## 2022-03-18 PROBLEM — F41.9 ANXIETY: Status: ACTIVE | Noted: 2017-01-30

## 2022-03-19 PROBLEM — G47.8 NOCTURNAL SLEEP-RELATED EATING DISORDER: Status: ACTIVE | Noted: 2021-04-07

## 2022-03-19 PROBLEM — E66.01 OBESITY, MORBID (HCC): Status: ACTIVE | Noted: 2018-03-01

## 2022-05-31 ENCOUNTER — TELEPHONE (OUTPATIENT)
Dept: SLEEP MEDICINE | Age: 67
End: 2022-05-31

## 2022-05-31 ENCOUNTER — TELEPHONE (OUTPATIENT)
Dept: CARDIOLOGY CLINIC | Age: 67
End: 2022-05-31

## 2022-05-31 NOTE — TELEPHONE ENCOUNTER
Patient needs a recent report 6 months to 1 year report of hgis sleep study up to date notes and the numbers and cpap report. He wants this sent to his [employer] .  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

## 2022-05-31 NOTE — TELEPHONE ENCOUNTER
Called pt back. 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 Pt stated that would be fine. Informed him that Dr. Shanta Muhammad first available is in August. Pt stated that he will need to see someone else since he has to have all of this done in 10 days. Scheduled pt at 930am on 6.1.22 with Dr. Melissa Ro. Understanding voiced.

## 2022-05-31 NOTE — TELEPHONE ENCOUNTER
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 States they only gave him 10 days to have this done. Please call and advise.

## 2022-05-31 NOTE — TELEPHONE ENCOUNTER
Spoke with patient and he is coming to  office notes and is taking his machine to [facility] to get download.

## 2022-06-01 ENCOUNTER — OFFICE VISIT (OUTPATIENT)
Dept: CARDIOLOGY CLINIC | Age: 67
End: 2022-06-01
Payer: MEDICARE

## 2022-06-01 VITALS
HEART RATE: 71 BPM | HEIGHT: 69 IN | DIASTOLIC BLOOD PRESSURE: 78 MMHG | WEIGHT: 238.2 LBS | BODY MASS INDEX: 35.28 KG/M2 | SYSTOLIC BLOOD PRESSURE: 120 MMHG

## 2022-06-01 DIAGNOSIS — I10 ESSENTIAL HYPERTENSION, BENIGN: Primary | ICD-10-CM

## 2022-06-01 DIAGNOSIS — I25.10 CORONARY ARTERY DISEASE INVOLVING NATIVE CORONARY ARTERY OF NATIVE HEART WITHOUT ANGINA PECTORIS: ICD-10-CM

## 2022-06-01 PROCEDURE — 1123F ACP DISCUSS/DSCN MKR DOCD: CPT | Performed by: INTERNAL MEDICINE

## 2022-06-01 PROCEDURE — 3017F COLORECTAL CA SCREEN DOC REV: CPT | Performed by: INTERNAL MEDICINE

## 2022-06-01 PROCEDURE — 1036F TOBACCO NON-USER: CPT | Performed by: INTERNAL MEDICINE

## 2022-06-01 PROCEDURE — G8428 CUR MEDS NOT DOCUMENT: HCPCS | Performed by: INTERNAL MEDICINE

## 2022-06-01 PROCEDURE — G8417 CALC BMI ABV UP PARAM F/U: HCPCS | Performed by: INTERNAL MEDICINE

## 2022-06-01 PROCEDURE — 99214 OFFICE O/P EST MOD 30 MIN: CPT | Performed by: INTERNAL MEDICINE

## 2022-06-01 PROCEDURE — 93000 ELECTROCARDIOGRAM COMPLETE: CPT | Performed by: INTERNAL MEDICINE

## 2022-06-01 NOTE — PROGRESS NOTES
[facility] CARDIOLOGY  [address] [facility], [address] [facility], [address] [place] [address]  PHONE: [phone]    NAME: Stephanie Gracia  : 1955     HPI  Stephanie Gracia is a 73 y.o. male seen for a follow up visit regarding   Hypertension and Annual Exam (for CDL)    He is here in f/u on his CAD. He has a history of stents and gastric bypass. He has a pretty intense work out schedule and feels well. Past Medical History, Past Surgical History, Family history, Social History, and Medications were all reviewed with the patient today and updated as necessary. [unfilled]  No Known Allergies  Past Medical History:   Diagnosis Date    Anxiety     Coronary atherosclerosis of unspecified type of vessel, native or graft     History of diverticulosis     History of MI (myocardial infarction)  and     [facility] cardiology    Hypertension     Sleep apnea     uses CPAP    Stented coronary artery x 8    last ones in     Type II or unspecified type diabetes mellitus without mention of complication, not stated as uncontrolled      Past Surgical History:   Procedure Laterality Date    COLONOSCOPY  05/10/2016    CORONARY ANGIOPLASTY WITH STENT PLACEMENT      total of 8 stents    GASTRIC BYPASS SURGERY  2017    gastric sleeve    HERNIA REPAIR  6089    x 2 umbilical; inguinal     Family History   Problem Relation Age of Onset    No Known Problems Brother     Breast Cancer Mother     Diabetes Father     Colon Cancer Father       Social History     Tobacco Use    Smoking status: Former Smoker     Packs/day: 3.00     Quit date: 2009     Years since quittin.4    Smokeless tobacco: Never Used    Tobacco comment: Quit smoking: quit    Substance Use Topics    Alcohol use: No     Prior to Admission medications    Medication Sig Start Date End Date Taking? Authorizing Provider   ALPRAZolam Jennet Gram) 1 MG tablet Take 1 mg by mouth 2 times daily as needed.  1/3/19  Yes Ar Automatic Reconciliation   aspirin 81 MG EC tablet Take by mouth daily   Yes Ar Automatic Reconciliation   cyanocobalamin 500 MCG tablet Take 500 mcg by mouth   Yes Ar Automatic Reconciliation   desvenlafaxine succinate (PRISTIQ) 50 MG TB24 extended release tablet Take 100 mg by mouth daily   Yes Ar Automatic Reconciliation   metFORMIN (GLUCOPHAGE) 500 MG tablet 2 tabs twice daily 2/7/19  Yes Ar Automatic Reconciliation   simvastatin (ZOCOR) 20 MG tablet Take 20 mg by mouth nightly  12/27/18  Yes Ar Automatic Reconciliation   traZODone (DESYREL) 50 MG tablet Take 50 mg by mouth 4/19/22  Yes Ar Automatic Reconciliation         [unfilled]              Wt Readings from Last 3 Encounters:   06/01/22 238 lb 3.2 oz (108 kg)   09/21/21 257 lb 11.2 oz (116.9 kg)   07/13/21 275 lb (124.7 kg)     BP Readings from Last 3 Encounters:   06/01/22 120/78   09/21/21 122/60   07/13/21 126/85       /78   Pulse 71   Ht 5' 9\" (1.753 m)   Wt 238 lb 3.2 oz (108 kg)   BMI 35.18 kg/m²       Physical Exam  Constitutional:       Appearance: Normal appearance. Neck:      Vascular: No carotid bruit. Cardiovascular:      Rate and Rhythm: Normal rate and regular rhythm. Heart sounds: Normal heart sounds. Abdominal:      General: Bowel sounds are normal.      Palpations: Abdomen is soft. Skin:     General: Skin is warm and dry. Neurological:      Mental Status: He is alert. EKG-Sinus  Rhythm 71 bpm  Low voltage with rightward P-axis and rotation      Medical problems and test results were reviewed with the patient today. No results found for any visits on 06/01/22.       No results found for: HBA1C, MOD2RQVV    No results found for: WBC, WBCLT, HGBPOC, HGB, HGBP, HCTPOC, HCT, PHCT, PLT, MCV    No results found for: NA, K, CL, CO2, AGAP, GLU, BUN, CREA, GFRAA, CA, GFRAA    No results found for: ALT, GGT, GGTP, APIT, APX    No results found for: CHOL, CHOLPOCT, CHOLX, CHLST, CHOLV, HDL, HDLPOC, HDLC, LDL, LDLC, VLDLC, VLDL, TGLX, TRIGL      ASSESSMENT and PLAN    Kimberly Huston was seen today for hypertension and annual exam.    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

## 2022-06-15 ENCOUNTER — OFFICE VISIT (OUTPATIENT)
Dept: ORTHOPEDIC SURGERY | Age: 67
End: 2022-06-15
Payer: MEDICARE

## 2022-06-15 DIAGNOSIS — S54.31XA: Primary | ICD-10-CM

## 2022-06-15 PROCEDURE — G8417 CALC BMI ABV UP PARAM F/U: HCPCS | Performed by: ORTHOPAEDIC SURGERY

## 2022-06-15 PROCEDURE — G8428 CUR MEDS NOT DOCUMENT: HCPCS | Performed by: ORTHOPAEDIC SURGERY

## 2022-06-15 PROCEDURE — 1123F ACP DISCUSS/DSCN MKR DOCD: CPT | Performed by: ORTHOPAEDIC SURGERY

## 2022-06-15 PROCEDURE — 1036F TOBACCO NON-USER: CPT | Performed by: ORTHOPAEDIC SURGERY

## 2022-06-15 PROCEDURE — 3017F COLORECTAL CA SCREEN DOC REV: CPT | Performed by: ORTHOPAEDIC SURGERY

## 2022-06-15 PROCEDURE — 99204 OFFICE O/P NEW MOD 45 MIN: CPT | Performed by: ORTHOPAEDIC SURGERY

## 2022-06-15 RX ORDER — METHYLPREDNISOLONE 4 MG/1
TABLET ORAL
Qty: 1 KIT | Refills: 0 | Status: SHIPPED | OUTPATIENT
Start: 2022-06-15

## 2022-06-15 NOTE — PROGRESS NOTES
Orthopaedic Hand Clinic Note    Name: Fili Felton  YOB: 1955  Gender: male  MRN: [id number]      CC: Patient referred for evaluation of upper extremity pain    HPI: Fili Felton is a 73 y.o. male Right hand dominant with a chief complaint of right arm numbness, patient reports symptoms started about 2 weeks ago, he thinks it started after a day of heavy workouts lifting and dropping weights, numbness is reported on the volar and radial aspect of the forearm but not in the fingers, not in the upper arm, he reports pain when stretching of the arm to  objects from the floor in the same distribution. ROS/Meds/PSH/PMH/FH/SH: I personally reviewed the patients standard intake form. Pertinents are discussed in the HPI    Physical Examination:  General: Awake and alert. HEENT: Normocephalic, atraumatic  CV/Pulm: Breathing even and unlabored  Skin: No obvious rashes noted. Lymphatic: No obvious evidence of lymphedema or lymphadenopathy    Musculoskeletal Exam:  Examination on the right upper extremity demonstrates cap refill < 5 seconds in all fingers, normal sensation in all fingers, normal sensation radial, median and ulnar distribution, patient reported decrease sensation is exclusively in the lateral antebrachial cutaneous distribution, there is some tenderness palpation at the junction between the first and second extensor compartment but this is mild and the patient reports that this is not the issue he has, he has a negative Spurling sign, negative Tinel at the antebrachial fossa, no increased symptoms with resisted FDS flexion, no increase in symptoms with resisted pronation or terminal passive supination, there is very mild subjective weakness of the biceps and brachialis compared to the contralateral side. Imaging / Electrodiagnostic Tests:     none    Assessment:   1.  Injury of right lateral antebrachial cutaneous nerve, initial encounter        Plan:   We discussed the diagnosis and different treatment options. We discussed observation, therapy, antiinflammatory medications and other pertinent treatment modalities. 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 Patient voiced accordance and understanding of the information provided and the formulated plan. All questions were answered to the patient's satisfaction during the encounter.     Powell Gaucher, MD  Orthopaedic Surgery  06/15/22  8:24 AM

## 2022-10-29 ENCOUNTER — TELEPHONE (OUTPATIENT)
Dept: PULMONOLOGY | Age: 67
End: 2022-10-29

## 2022-10-29 RX ORDER — TRAZODONE HYDROCHLORIDE 50 MG/1
50 TABLET ORAL NIGHTLY
Qty: 30 TABLET | Refills: 0 | Status: SHIPPED | OUTPATIENT
Start: 2022-10-29 | End: 2022-11-28 | Stop reason: SDUPTHER

## 2022-10-29 NOTE — TELEPHONE ENCOUNTER
Pt states he called the office on Tuesday for a refill on his Trazadone. Never heard back and is now out. Sees Dr Kala Severe, last seen in April and was given a 6 month supply. Has an appointment in November for follow up. Filled a 1 month supply and sent it to the pharmacy of his choice.      Jennifer Morales MD

## 2022-11-22 RX ORDER — TRAZODONE HYDROCHLORIDE 50 MG/1
50 TABLET ORAL NIGHTLY
Qty: 30 TABLET | Refills: 0 | OUTPATIENT
Start: 2022-11-22

## 2022-11-28 ENCOUNTER — TELEPHONE (OUTPATIENT)
Dept: SLEEP MEDICINE | Age: 67
End: 2022-11-28

## 2022-11-28 DIAGNOSIS — G47.8 OTHER SLEEP DISORDERS: Primary | ICD-10-CM

## 2022-11-28 RX ORDER — TRAZODONE HYDROCHLORIDE 50 MG/1
50 TABLET ORAL NIGHTLY
Qty: 30 TABLET | Refills: 2 | Status: SHIPPED | OUTPATIENT
Start: 2022-11-28 | End: 2022-11-29 | Stop reason: SDUPTHER

## 2022-11-28 NOTE — TELEPHONE ENCOUNTER
Patient says that he needs a prescription of      Disp Refills Start End    traZODone (DESYREL) 50 MG tablet 30 tablet 0 10/29/2022     Sig - Route: Take 1 tablet by mouth nightly - Oral        Tonight is his last pill. He also needs a new cpap machine. He said the last two appt we have cancelled those .

## 2022-11-29 ENCOUNTER — TELEPHONE (OUTPATIENT)
Dept: SLEEP MEDICINE | Age: 67
End: 2022-11-29

## 2022-11-29 DIAGNOSIS — G47.8 OTHER SLEEP DISORDERS: ICD-10-CM

## 2022-11-29 RX ORDER — TRAZODONE HYDROCHLORIDE 50 MG/1
50 TABLET ORAL NIGHTLY
Qty: 30 TABLET | Refills: 2 | Status: SHIPPED | OUTPATIENT
Start: 2022-11-29

## 2022-11-29 NOTE — PROGRESS NOTES
[address] [place] [address] Court, [address] [facility]  [phone]    Patient Name:  Abhishek Urbina  YOB: 1955      Office Visit 11/30/2022    CHIEF COMPLAINT:    Chief Complaint   Patient presents with    Follow-up    Sleep Apnea    CPAP/BiPAP         HISTORY OF PRESENT ILLNESS:        This patient was evaluated today for management of sleep apnea and difficulty maintaining sleep and daytime sleepiness. The patient currently is on CPAP at 10-14 cm. He is using full facemask and he indicated excellent daily compliance. He is using and benefiting from his CPAP on a daily basis. His download indicated 99% compliance and average daily use is 6 hours and 18 minutes nightly. He indicated he started his daily activity at the [address] [place] [address] before going to work and he is working diligently on his [employer]. He was congratulated on that and he lost approximately 2 pounds since last office visit. His  download indicated that his AHI is controlled at 1.5/hour and median leak is 15.9 L/min. He started using trazodone and melatonin had definite improvement in his sleep maintenance since his last office visit. Overall, he feels that he is moving the right  direction with improving his sleep quality and maintaining his effort for his weight management. His machine is broken beyond repair and needs replacement. We will update his mask and supplies order and order replacement machine at the same setting for now. The Trout score today 3 out of 24.     Sleep Medicine 11/30/2022 4/19/2022 9/21/2021 4/7/2021   Sitting and reading 0 0 2 2   Watching TV 1 0 0 1   Sitting, inactive in a public place (e.g. a theatre or a meeting) 0 0 0 2   As a passenger in a car for an hour without a break 0 0 0 2   Lying down to rest in the afternoon when circumstances permit 2 0 3 3   Sitting and talking to someone 0 0 0 1   Sitting quietly after a lunch without alcohol 0 0 0 2   In a car, while stopped for a few minutes in traffic 0 0 0 1   [place] Sleepiness Score 3 0 5 14               Past Medical History:   Diagnosis Date    Anxiety     Coronary atherosclerosis of unspecified type of vessel, native or graft     History of diverticulosis     History of MI (myocardial infarction)  and     [facility] cardiology    Hypertension     Sleep apnea     uses CPAP    Stented coronary artery x 8    last ones in     Type II or unspecified type diabetes mellitus without mention of complication, not stated as uncontrolled          Patient Active Problem List   Diagnosis    Essential hypertension, benign    Anxiety    Coronary artery disease    WAYLON on CPAP    Obesity (BMI 30-39. 9)    Diabetes mellitus ([address]    Obesity, morbid (HCC)    Nocturnal sleep-related eating disorder    Hypersomnia due to another medical condition          Past Surgical History:   Procedure Laterality Date    COLONOSCOPY  05/10/2016    CORONARY ANGIOPLASTY WITH STENT PLACEMENT      total of 8 stents    GASTRIC BYPASS SURGERY  2017    gastric sleeve    HERNIA REPAIR  6474    x 2 umbilical; inguinal       [unfilled]        Social History     Socioeconomic History    Marital status:      Spouse name: Not on file    Number of children: Not on file    Years of education: Not on file    Highest education level: Not on file   Occupational History    Not on file   Tobacco Use    Smoking status: Former     Packs/day: 3.00     Types: Cigarettes     Quit date: 2009     Years since quittin.9    Smokeless tobacco: Never    Tobacco comments:     Quit smoking: quit    Substance and Sexual Activity    Alcohol use: No    Drug use: No    Sexual activity: Not on file   Other Topics Concern    Not on file   Social History Narrative    Not on file     Social Determinants of Health     Financial Resource Strain: Not on file   Food Insecurity: Not on file   Transportation Needs: Not on file   Physical Activity: Not on file   Stress: Not on file   Social Connections: Not on file   Intimate Partner Violence: Not on file   Housing Stability: Not on file         Family History   Problem Relation Age of Onset    No Known Problems Brother     Breast Cancer Mother     Diabetes Father     Colon Cancer Father          No Known Allergies      Current Outpatient Medications   Medication Sig    traZODone (DESYREL) 50 MG tablet Take 1 tablet by mouth nightly    methylPREDNISolone (MEDROL DOSEPACK) 4 MG tablet Follow package instructions    ALPRAZolam (XANAX) 1 MG tablet Take 1 mg by mouth 2 times daily as needed. aspirin 81 MG EC tablet Take by mouth daily    cyanocobalamin 500 MCG tablet Take 500 mcg by mouth    desvenlafaxine succinate (PRISTIQ) 50 MG TB24 extended release tablet Take 100 mg by mouth daily    metFORMIN (GLUCOPHAGE) 500 MG tablet 2 tabs twice daily    simvastatin (ZOCOR) 20 MG tablet Take 20 mg by mouth nightly      No current facility-administered medications for this visit. REVIEW OF SYSTEMS:   CONSTITUTIONAL:   There is no history of fever, chills, night sweats, weight loss, weight gain, persistent fatigue, or lethargy/hypersomnolence. CARDIAC:   No chest pain, pressure, discomfort, palpitations, orthopnea, murmurs, or edema. GI:   No dysphagia, heartburn reflux, nausea/vomiting, diarrhea, abdominal pain, or bleeding. NEURO:   There is no history of AMS, persistent headache, decreased level of consciousness, seizures, or motor or sensory deficits. PHYSICAL EXAM:    Vitals:    11/30/22 1130   BP: 108/64   Pulse: 66   Resp: 14   Temp: 98.1 °F (36.7 °C)   SpO2: 95%        GENERAL APPEARANCE:   The patient is normal weight and in no respiratory distress. HEENT:   PERRL. Conjunctivae unremarkable. Nasal mucosa is without epistaxis, exudate, or polyps. Gums and dentition are unremarkable. There is moderate oropharyngeal narrowing.   He has micrognathia   NECK/LYMPHATIC:   Symmetrical with no elevation of jugular venous pulsation. Trachea midline. No thyroid enlargement. No cervical adenopathy. LUNGS:   Normal respiratory effort with symmetrical lung expansion. Breath sounds are clear. HEART:   There is a regular rate and rhythm. No murmur, rub, or gallop. There is no edema in the lower extremities. ABDOMEN:   Soft and non-tender. No hepatosplenomegaly. Bowel sounds are normal.     NEURO:   The patient is alert and oriented to person, place, and time. Memory appears intact and mood is normal.  No gross sensorimotor deficits are present. ASSESSMENT:  (Medical Decision Making)      Diagnosis Orders   1. WAYLON on CPAP at 10-14 cm with excellent compliance. His machine is broken beyond repair and needs replacement. DME - DURABLE MEDICAL EQUIPMENT      2. Hypersomnia due to another medical condition , improved with the CPAP treatment DME - DURABLE MEDICAL EQUIPMENT      3.  Obesity (BMI 30-39.9) , he was congratulated on his ongoing weight management and moderate physical activity with excellent result and his weight loss            PLAN:    Replacement machine with the setting of 10-14 cm and EPR 2 will be ordered today    Continue proper sleep hygiene and positional therapy    Renew his mask and supplies order    Maintain his follow-up with other providers    Return to the sleep center in 4 months or sooner if needed    All questions and concerns were addressed properly      Orders Placed This Encounter   Procedures    DME - [facility] [address] [facility]    [facility]  Phone: [phone] [address]  Dept: [phone]      Patient Name: Guera Donohue  : 1955  Gender: male  Address: [place] [address]  Patient phone number: [phone] (home)       Primary Insurance: Payor: MEDICARE / Plan: MEDICARE PART A AND B / Product Type: *No Product type* /   Subscriber ID: [id number] - (Medicare)      AMB Supply Order  Order Details     DME Location:     Order Date: 11/30/2022   The primary encounter diagnosis was WAYLON on CPAP. Diagnoses of Hypersomnia due to another medical condition and Anxiety were also pertinent to this visit. (  X   )New Set-Up (replacement machine)       machine   (     )[id number] CPAP Unit  (    x ) Auto CPAP Unit  (     ) BiLevel Unit  (     ) Auto BiLevel Unit  (     ) ASV   (     ) Bilevel ST    (     ) Oxygen Concentrator         Length of need: 12 months    Pressure: 10-14 cmH20  EPR: 2     Starting Ramp Pressure:    cm H20  Ramp Time: min       Patient had a diagnostic Apnea Hypopnea Index (AHI) of :       *SUPPLIES* Replace all as needed, or per coverage guidelines     Masks Type:    (  x   ) -Full Face Mask (1 per 3 mon)  ( x    ) -Full Mask (1 per month) Interface/Cushion      (     ) -Nasal Mask (1 per 3 mon)  (     ) - Nasal Mask (1 per month) Interface/Cushion  (     ) -Pillow (2 per mon)  (     ) -Lsxmagspo (1 per 6 mon)      _________________________________________________________________          Other Supplies:    (  X   )-Djrxrkad (1 per 6 mon)  ( X    )-Blgblt Tubing (1 per 3 mon)  (  X   )- Disposable Filter (2 per mon)  (   X  )-Xpuclo Humidifier (1 per year)     (  x   )-Tuanavvao (sometimes used with Full Face Mask) (1 per 6 mos)  (     )-Tubing-without heat (1 per 3 mos)  ( X   )-Non-Disposable Filter (1 per 6 mos)  (   x  )-Water Chamber (1 per 6 mos)  (     )-Humidifier non-heated (1 per 5 yrs)      Signed Date: 11/30/2022  Electronically Signed By: Meka Carrasquillo MD        No orders of the defined types were placed in this encounter.        Over 50% of today's office visit was spent in face to face time reviewing test results, prognosis, importance of compliance, education about disease process, benefits of medications, instructions for management of acute flare-ups, and follow up plans. Total face to face time spent with patient and charting was 30 minutes.         Mukesh Montero MD  Electronically signed

## 2022-11-29 NOTE — TELEPHONE ENCOUNTER
Patient called stating trazodone was sent to wrong pharmacy. Will need to be sent to [facility] in [facility].

## 2022-11-30 ENCOUNTER — OFFICE VISIT (OUTPATIENT)
Dept: SLEEP MEDICINE | Age: 67
End: 2022-11-30
Payer: MEDICARE

## 2022-11-30 VITALS
WEIGHT: 235 LBS | RESPIRATION RATE: 14 BRPM | HEIGHT: 69 IN | HEART RATE: 66 BPM | SYSTOLIC BLOOD PRESSURE: 108 MMHG | OXYGEN SATURATION: 95 % | BODY MASS INDEX: 34.8 KG/M2 | TEMPERATURE: 98.1 F | DIASTOLIC BLOOD PRESSURE: 64 MMHG

## 2022-11-30 DIAGNOSIS — Z99.89 OSA ON CPAP: Primary | ICD-10-CM

## 2022-11-30 DIAGNOSIS — G47.33 OSA ON CPAP: Primary | ICD-10-CM

## 2022-11-30 DIAGNOSIS — E66.9 OBESITY (BMI 30-39.9): ICD-10-CM

## 2022-11-30 DIAGNOSIS — G47.14 HYPERSOMNIA DUE TO ANOTHER MEDICAL CONDITION: ICD-10-CM

## 2022-11-30 PROCEDURE — 3074F SYST BP LT 130 MM HG: CPT | Performed by: INTERNAL MEDICINE

## 2022-11-30 PROCEDURE — 1123F ACP DISCUSS/DSCN MKR DOCD: CPT | Performed by: INTERNAL MEDICINE

## 2022-11-30 PROCEDURE — G8417 CALC BMI ABV UP PARAM F/U: HCPCS | Performed by: INTERNAL MEDICINE

## 2022-11-30 PROCEDURE — G8484 FLU IMMUNIZE NO ADMIN: HCPCS | Performed by: INTERNAL MEDICINE

## 2022-11-30 PROCEDURE — 3078F DIAST BP <80 MM HG: CPT | Performed by: INTERNAL MEDICINE

## 2022-11-30 PROCEDURE — 3017F COLORECTAL CA SCREEN DOC REV: CPT | Performed by: INTERNAL MEDICINE

## 2022-11-30 PROCEDURE — 99214 OFFICE O/P EST MOD 30 MIN: CPT | Performed by: INTERNAL MEDICINE

## 2022-11-30 PROCEDURE — 1036F TOBACCO NON-USER: CPT | Performed by: INTERNAL MEDICINE

## 2022-11-30 PROCEDURE — G8427 DOCREV CUR MEDS BY ELIG CLIN: HCPCS | Performed by: INTERNAL MEDICINE

## 2022-11-30 ASSESSMENT — SLEEP AND FATIGUE QUESTIONNAIRES
HOW LIKELY ARE YOU TO NOD OFF OR FALL ASLEEP WHILE SITTING QUIETLY AFTER LUNCH WITHOUT ALCOHOL: 0
HOW LIKELY ARE YOU TO NOD OFF OR FALL ASLEEP WHILE SITTING INACTIVE IN A PUBLIC PLACE: 0
ESS TOTAL SCORE: 3
HOW LIKELY ARE YOU TO NOD OFF OR FALL ASLEEP WHEN YOU ARE A PASSENGER IN A CAR FOR AN HOUR WITHOUT A BREAK: 0
HOW LIKELY ARE YOU TO NOD OFF OR FALL ASLEEP IN A CAR, WHILE STOPPED FOR A FEW MINUTES IN TRAFFIC: 0
HOW LIKELY ARE YOU TO NOD OFF OR FALL ASLEEP WHILE LYING DOWN TO REST IN THE AFTERNOON WHEN CIRCUMSTANCES PERMIT: 2
HOW LIKELY ARE YOU TO NOD OFF OR FALL ASLEEP WHILE WATCHING TV: 1
HOW LIKELY ARE YOU TO NOD OFF OR FALL ASLEEP WHILE SITTING AND TALKING TO SOMEONE: 0
HOW LIKELY ARE YOU TO NOD OFF OR FALL ASLEEP WHILE SITTING AND READING: 0

## 2023-01-19 ENCOUNTER — TRANSCRIBE ORDERS (OUTPATIENT)
Dept: SCHEDULING | Age: 68
End: 2023-01-19

## 2023-01-19 DIAGNOSIS — Z87.891 PERSONAL HISTORY OF TOBACCO USE, PRESENTING HAZARDS TO HEALTH: ICD-10-CM

## 2023-01-19 DIAGNOSIS — Z13.6 SCREENING FOR CARDIOVASCULAR CONDITION: Primary | ICD-10-CM

## 2023-01-26 ENCOUNTER — HOSPITAL ENCOUNTER (OUTPATIENT)
Dept: ULTRASOUND IMAGING | Age: 68
Discharge: HOME | End: 2023-01-26
Payer: MEDICARE

## 2023-01-26 DIAGNOSIS — Z13.6 SCREENING FOR CARDIOVASCULAR CONDITION: ICD-10-CM

## 2023-01-26 DIAGNOSIS — Z87.891 PERSONAL HISTORY OF TOBACCO USE, PRESENTING HAZARDS TO HEALTH: ICD-10-CM

## 2023-01-26 PROCEDURE — 76706 US ABDL AORTA SCREEN AAA: CPT

## 2023-02-13 DIAGNOSIS — G47.8 OTHER SLEEP DISORDERS: ICD-10-CM

## 2023-02-13 RX ORDER — TRAZODONE HYDROCHLORIDE 50 MG/1
50 TABLET ORAL NIGHTLY
Qty: 30 TABLET | Refills: 1 | Status: SHIPPED | OUTPATIENT
Start: 2023-02-13

## 2023-04-26 ENCOUNTER — TELEPHONE (OUTPATIENT)
Dept: SLEEP MEDICINE | Age: 68
End: 2023-04-26

## 2023-04-26 DIAGNOSIS — G47.8 OTHER SLEEP DISORDERS: ICD-10-CM

## 2023-04-26 RX ORDER — TRAZODONE HYDROCHLORIDE 50 MG/1
50 TABLET ORAL NIGHTLY
Qty: 30 TABLET | Refills: 1 | Status: SHIPPED | OUTPATIENT
Start: 2023-04-26

## 2023-04-26 NOTE — TELEPHONE ENCOUNTER
Patient Refill Request     Last Office Visit: 11/30/22     When they were supposed to follow up: March 2023     Upcoming Office Visit: 6/20/23     Refills Requested: Trazodone 50mg     Type of refill:      [facility]ed Pharmacy: [id number]     Is prescription pended?  No

## 2023-07-10 NOTE — PROGRESS NOTES
-Slenfbdci (1 per 6 mon)            Other Supplies:    (   X  )-Byaljetu (1 per 6 mon)  (   X  )-Jkachd Tubing (1 per 3 mon)  (   X  )- Disposable Filter (2 per mon)  (   x  )-Lcjzty Humidifier (1 per year)     ( x    )-Nuoiknwll (sometimes used with Full Face Mask) (1 per 6 mos)  (    )-Tubing-without heat (1 per 3 mos)  (     )-Non-Disposable Filter (1 per 6 mos)  (  x   )-Water Chamber (1 per 6 mos)  (     )-Humidifier non-heated (1 per 5 yrs)      Signed Date: 7/11/2023  Electronically Signed By: LASHAWN Vyas CNP  Electronically Dated:  7/11/2023     Orders Placed This Encounter   Medications    traZODone (DESYREL) 50 MG tablet     Sig: Take 1 tablet by mouth nightly     Dispense:  30 tablet     Refill:  6         Collaborating Physician: Dr. Beth Shabazz    Today's office visit was spent  reviewing test results, prognosis, importance of compliance, education about disease process, benefits of medications, instructions for management of acute flare-ups, and follow up plans. Total time spent with patient was 20 minutes.         LASHAWN Vyas CNP  Electronically signed

## 2023-07-11 ENCOUNTER — TELEPHONE (OUTPATIENT)
Dept: SLEEP MEDICINE | Age: 68
End: 2023-07-11

## 2023-07-11 ENCOUNTER — OFFICE VISIT (OUTPATIENT)
Dept: SLEEP MEDICINE | Age: 68
End: 2023-07-11
Payer: MEDICARE

## 2023-07-11 VITALS
DIASTOLIC BLOOD PRESSURE: 68 MMHG | WEIGHT: 249 LBS | HEIGHT: 69 IN | OXYGEN SATURATION: 95 % | TEMPERATURE: 97.4 F | BODY MASS INDEX: 36.88 KG/M2 | HEART RATE: 68 BPM | SYSTOLIC BLOOD PRESSURE: 118 MMHG

## 2023-07-11 DIAGNOSIS — E66.9 OBESITY (BMI 35.0-39.9 WITHOUT COMORBIDITY): ICD-10-CM

## 2023-07-11 DIAGNOSIS — G47.33 OSA (OBSTRUCTIVE SLEEP APNEA): Primary | ICD-10-CM

## 2023-07-11 DIAGNOSIS — G47.00 PERSISTENT DISORDER OF INITIATING OR MAINTAINING SLEEP: ICD-10-CM

## 2023-07-11 PROBLEM — G47.8 OTHER SLEEP DISORDERS: Status: ACTIVE | Noted: 2021-04-07

## 2023-07-11 PROCEDURE — 1036F TOBACCO NON-USER: CPT | Performed by: NURSE PRACTITIONER

## 2023-07-11 PROCEDURE — 3017F COLORECTAL CA SCREEN DOC REV: CPT | Performed by: NURSE PRACTITIONER

## 2023-07-11 PROCEDURE — G8417 CALC BMI ABV UP PARAM F/U: HCPCS | Performed by: NURSE PRACTITIONER

## 2023-07-11 PROCEDURE — 99213 OFFICE O/P EST LOW 20 MIN: CPT | Performed by: NURSE PRACTITIONER

## 2023-07-11 PROCEDURE — G8427 DOCREV CUR MEDS BY ELIG CLIN: HCPCS | Performed by: NURSE PRACTITIONER

## 2023-07-11 PROCEDURE — 3074F SYST BP LT 130 MM HG: CPT | Performed by: NURSE PRACTITIONER

## 2023-07-11 PROCEDURE — 1123F ACP DISCUSS/DSCN MKR DOCD: CPT | Performed by: NURSE PRACTITIONER

## 2023-07-11 PROCEDURE — 3078F DIAST BP <80 MM HG: CPT | Performed by: NURSE PRACTITIONER

## 2023-07-11 RX ORDER — TRAZODONE HYDROCHLORIDE 50 MG/1
50 TABLET ORAL NIGHTLY
Qty: 30 TABLET | Refills: 11 | Status: SHIPPED | OUTPATIENT
Start: 2023-07-11

## 2023-07-11 ASSESSMENT — SLEEP AND FATIGUE QUESTIONNAIRES
HOW LIKELY ARE YOU TO NOD OFF OR FALL ASLEEP IN A CAR, WHILE STOPPED FOR A FEW MINUTES IN TRAFFIC: 0
HOW LIKELY ARE YOU TO NOD OFF OR FALL ASLEEP WHILE SITTING AND READING: 0
HOW LIKELY ARE YOU TO NOD OFF OR FALL ASLEEP WHEN YOU ARE A PASSENGER IN A CAR FOR AN HOUR WITHOUT A BREAK: 0
HOW LIKELY ARE YOU TO NOD OFF OR FALL ASLEEP WHILE SITTING AND TALKING TO SOMEONE: 0
HOW LIKELY ARE YOU TO NOD OFF OR FALL ASLEEP WHILE LYING DOWN TO REST IN THE AFTERNOON WHEN CIRCUMSTANCES PERMIT: 3
HOW LIKELY ARE YOU TO NOD OFF OR FALL ASLEEP WHILE SITTING INACTIVE IN A PUBLIC PLACE: 0
HOW LIKELY ARE YOU TO NOD OFF OR FALL ASLEEP WHILE WATCHING TV: 0
ESS TOTAL SCORE: 3
HOW LIKELY ARE YOU TO NOD OFF OR FALL ASLEEP WHILE SITTING QUIETLY AFTER LUNCH WITHOUT ALCOHOL: 0

## 2023-07-11 NOTE — PATIENT INSTRUCTIONS
Continue CPAP 10-14 cm H2O with nightly compliance  New supplies ordered  Trazodone refill  Recommendations as above  Follow-up in 1 year or sooner if needed

## 2023-09-22 NOTE — PERIOP NOTES
Patient verified name, , and surgery as listed in [facility]. Type 2 surgery, walk in assessment complete. Labs per surgeon: none;   Labs per anesthesia protocol: hemoglobin: 14.5; SQBS:  174; within anesthesia guidelines; printed/placed on chart~routed to PCP, Dr Vj Phan and to surgeon, Dr. Miguel Luis for further review. EKG: on chart from 3/16/17 along with cardiac clearance /ok to hold Plavix from Dr. Florencia Martinez; placed onc guzman for reference. Hibiclens and instructions given per hospital policy. Patient provided with handouts including Guide to Surgery, Pain Management, Hand Hygiene, Blood Transfusion Education, and [facility] Brochure. Patient answered medical/surgical history questions at their best of ability. All prior to admission medications documented in [facility]. Original medication prescription bottle NOT visualized during patient appointment. Patient instructed to hold all vitamins 7 days prior to surgery and NSAIDS 5 days prior to surgery, patient verbalized understanding. Medications to be held Plavix hold for 7 days prior to surgery; Change 325 mg Aspirin to 81 mg Aspirin 5 days prior to surgery per anesthesia guidelines. Patient instructed to continue previous medications as prescribed prior to surgery and to take the following medications the day of surgery according to anesthesia guidelines with a small sip of water: Carvedilol, Lipitor, Prilosec. Patient reminded to bring CPAP machine and non-formulary Prilosec DOS. Patient taught back and verbalized understanding. Consent (Marginal Mandibular)/Introductory Paragraph: The rationale for Mohs was explained to the patient and consent was obtained. The risks, benefits and alternatives to therapy were discussed in detail. Specifically, the risks of damage to the marginal mandibular branch of the facial nerve, infection, scarring, bleeding, prolonged wound healing, incomplete removal, allergy to anesthesia, and recurrence were addressed. Prior to the procedure, the treatment site was clearly identified and confirmed by the patient. All components of Universal Protocol/PAUSE Rule completed.

## 2024-07-11 ENCOUNTER — OFFICE VISIT (OUTPATIENT)
Dept: SLEEP MEDICINE | Age: 69
End: 2024-07-11

## 2024-07-11 VITALS
OXYGEN SATURATION: 94 % | HEIGHT: 69 IN | RESPIRATION RATE: 18 BRPM | WEIGHT: 258 LBS | HEART RATE: 70 BPM | BODY MASS INDEX: 38.21 KG/M2 | SYSTOLIC BLOOD PRESSURE: 114 MMHG | DIASTOLIC BLOOD PRESSURE: 74 MMHG

## 2024-07-11 DIAGNOSIS — G47.00 PERSISTENT DISORDER OF INITIATING OR MAINTAINING SLEEP: ICD-10-CM

## 2024-07-11 DIAGNOSIS — G47.33 OSA (OBSTRUCTIVE SLEEP APNEA): Primary | ICD-10-CM

## 2024-07-11 DIAGNOSIS — E66.9 OBESITY (BMI 35.0-39.9 WITHOUT COMORBIDITY): ICD-10-CM

## 2024-07-11 RX ORDER — TRAZODONE HYDROCHLORIDE 50 MG/1
50 TABLET ORAL NIGHTLY
Qty: 30 TABLET | Refills: 11 | Status: SHIPPED | OUTPATIENT
Start: 2024-07-11

## 2024-07-11 ASSESSMENT — SLEEP AND FATIGUE QUESTIONNAIRES
HOW LIKELY ARE YOU TO NOD OFF OR FALL ASLEEP WHILE WATCHING TV: WOULD NEVER DOZE
HOW LIKELY ARE YOU TO NOD OFF OR FALL ASLEEP WHILE SITTING AND TALKING TO SOMEONE: WOULD NEVER DOZE
HOW LIKELY ARE YOU TO NOD OFF OR FALL ASLEEP WHILE SITTING INACTIVE IN A PUBLIC PLACE: WOULD NEVER DOZE
HOW LIKELY ARE YOU TO NOD OFF OR FALL ASLEEP IN A CAR, WHILE STOPPED FOR A FEW MINUTES IN TRAFFIC: WOULD NEVER DOZE
HOW LIKELY ARE YOU TO NOD OFF OR FALL ASLEEP WHEN YOU ARE A PASSENGER IN A CAR FOR AN HOUR WITHOUT A BREAK: WOULD NEVER DOZE
HOW LIKELY ARE YOU TO NOD OFF OR FALL ASLEEP WHILE SITTING AND READING: WOULD NEVER DOZE
ESS TOTAL SCORE: 0
HOW LIKELY ARE YOU TO NOD OFF OR FALL ASLEEP WHILE SITTING QUIETLY AFTER LUNCH WITHOUT ALCOHOL: WOULD NEVER DOZE
HOW LIKELY ARE YOU TO NOD OFF OR FALL ASLEEP WHILE LYING DOWN TO REST IN THE AFTERNOON WHEN CIRCUMSTANCES PERMIT: WOULD NEVER DOZE

## 2024-07-11 NOTE — PATIENT INSTRUCTIONS
Continue CPAP 10-14 cm H2O with nightly compliance  New CPAP supplies ordered  Recommendations as above  Trazodone refill  Follow-up in 1 year or sooner if needed

## 2024-07-11 NOTE — PROGRESS NOTES
)-Yuyhzaxpc (sometimes used with Full Face Mask) (1 per 6 mos)  (    )-Tubing-without heat (1 per 3 mos)  (     )-Non-Disposable Filter (1 per 6 mos)  (  x   )-Water Chamber (1 per 6 mos)  (     )-Humidifier non-heated (1 per 5 yrs)      Signed Date: 7/11/2024  Electronically Signed By: LASHAWN Stahl CNP  Electronically Dated:  7/11/2024             Collaborating Physician: Dr. Rodriguez    Today's office visit was spent  reviewing test results, prognosis, importance of compliance, education about disease process, benefits of medications, instructions for management of acute flare-ups, and follow up plans.  Total time spent with patient was 20 minutes.        LASHAWN Stahl CNP  Electronically signed

## 2025-07-09 ENCOUNTER — OFFICE VISIT (OUTPATIENT)
Dept: SLEEP MEDICINE | Age: 70
End: 2025-07-09
Payer: MEDICARE

## 2025-07-09 VITALS
HEART RATE: 54 BPM | WEIGHT: 250 LBS | SYSTOLIC BLOOD PRESSURE: 116 MMHG | DIASTOLIC BLOOD PRESSURE: 73 MMHG | OXYGEN SATURATION: 95 % | HEIGHT: 70 IN | BODY MASS INDEX: 35.79 KG/M2

## 2025-07-09 DIAGNOSIS — E66.9 OBESITY (BMI 35.0-39.9 WITHOUT COMORBIDITY): ICD-10-CM

## 2025-07-09 DIAGNOSIS — G47.00 PERSISTENT DISORDER OF INITIATING OR MAINTAINING SLEEP: ICD-10-CM

## 2025-07-09 DIAGNOSIS — G47.33 OSA (OBSTRUCTIVE SLEEP APNEA): Primary | ICD-10-CM

## 2025-07-09 PROCEDURE — 3074F SYST BP LT 130 MM HG: CPT | Performed by: NURSE PRACTITIONER

## 2025-07-09 PROCEDURE — 1036F TOBACCO NON-USER: CPT | Performed by: NURSE PRACTITIONER

## 2025-07-09 PROCEDURE — 3078F DIAST BP <80 MM HG: CPT | Performed by: NURSE PRACTITIONER

## 2025-07-09 PROCEDURE — G8417 CALC BMI ABV UP PARAM F/U: HCPCS | Performed by: NURSE PRACTITIONER

## 2025-07-09 PROCEDURE — G8427 DOCREV CUR MEDS BY ELIG CLIN: HCPCS | Performed by: NURSE PRACTITIONER

## 2025-07-09 PROCEDURE — 1123F ACP DISCUSS/DSCN MKR DOCD: CPT | Performed by: NURSE PRACTITIONER

## 2025-07-09 PROCEDURE — 3017F COLORECTAL CA SCREEN DOC REV: CPT | Performed by: NURSE PRACTITIONER

## 2025-07-09 PROCEDURE — 99213 OFFICE O/P EST LOW 20 MIN: CPT | Performed by: NURSE PRACTITIONER

## 2025-07-09 PROCEDURE — 1160F RVW MEDS BY RX/DR IN RCRD: CPT | Performed by: NURSE PRACTITIONER

## 2025-07-09 PROCEDURE — G2211 COMPLEX E/M VISIT ADD ON: HCPCS | Performed by: NURSE PRACTITIONER

## 2025-07-09 PROCEDURE — 1159F MED LIST DOCD IN RCRD: CPT | Performed by: NURSE PRACTITIONER

## 2025-07-09 RX ORDER — TRAZODONE HYDROCHLORIDE 50 MG/1
50 TABLET ORAL NIGHTLY
Qty: 30 TABLET | Refills: 11 | Status: SHIPPED | OUTPATIENT
Start: 2025-07-09

## 2025-07-09 ASSESSMENT — SLEEP AND FATIGUE QUESTIONNAIRES
HOW LIKELY ARE YOU TO NOD OFF OR FALL ASLEEP WHILE SITTING INACTIVE IN A PUBLIC PLACE: WOULD NEVER DOZE
HOW LIKELY ARE YOU TO NOD OFF OR FALL ASLEEP WHILE SITTING AND TALKING TO SOMEONE: WOULD NEVER DOZE
HOW LIKELY ARE YOU TO NOD OFF OR FALL ASLEEP WHILE WATCHING TV: WOULD NEVER DOZE
HOW LIKELY ARE YOU TO NOD OFF OR FALL ASLEEP WHEN YOU ARE A PASSENGER IN A CAR FOR AN HOUR WITHOUT A BREAK: WOULD NEVER DOZE
ESS TOTAL SCORE: 1
HOW LIKELY ARE YOU TO NOD OFF OR FALL ASLEEP WHILE SITTING QUIETLY AFTER LUNCH WITHOUT ALCOHOL: WOULD NEVER DOZE
HOW LIKELY ARE YOU TO NOD OFF OR FALL ASLEEP WHILE SITTING AND READING: WOULD NEVER DOZE
HOW LIKELY ARE YOU TO NOD OFF OR FALL ASLEEP WHILE LYING DOWN TO REST IN THE AFTERNOON WHEN CIRCUMSTANCES PERMIT: SLIGHT CHANCE OF DOZING
HOW LIKELY ARE YOU TO NOD OFF OR FALL ASLEEP IN A CAR, WHILE STOPPED FOR A FEW MINUTES IN TRAFFIC: WOULD NEVER DOZE

## 2025-07-09 NOTE — PROGRESS NOTES
[facility]  [address]  [place] [address]  [phone]    Patient Name:  Tex Win  YOB: 1955      Office Visit 7/9/2025    CHIEF COMPLAINT:    Chief Complaint   Patient presents with    Sleep Apnea     History of Present Illness  Patient is a 69 y.o. male seen today for follow up of WAYLON. Diagnostic sleep study on 01/08/2015 with an AHI of 6.1 and lowest oxygen saturation of 82%. He is prescribed cpap therapy with a humidifier set at 10-14 cm with a full face mask. Most recent download reveals AHI on PAP therapy is 1.4, leak is median 5.5 and 33.1 at 95th percentile and the hourly usage is 5 hours 37 minutes nightly. The overall use is 1971 hours with days greater than four hours at 294/362. The patient is compliant with the Pap therapy and is feeling better as a result.     He reports no issues with his CPAP machine or supplies and continues to feel well upon waking.  Denies any excessive daytime sleepiness or fatigue.  [place] score is 1/24.  He is currently on trazodone, which he takes nightly and finds effective.    He underwent gallbladder removal surgery approximately 6 months ago, which was successful and did not result in any complications. He experienced a sudden onset of symptoms that led to his hospital admission. Initially, he was diagnosed with indigestion and sent home. However, his condition worsened the following day, prompting a return to the hospital where significant changes were noted in his blood work within a 24-hour period. This led to an immediate decision for gallbladder surgery. Post-surgery, he was informed that he would need another procedure the next day to clean out his bile duct. His hospital stay lasted 5 days.    MEDICATIONS  trazodone      [place] Sleepiness Scale      7/9/2025     9:50 AM 7/11/2024     8:41 AM 7/11/2023    12:49 PM 11/30/2022    11:38 AM 4/19/2022     2:00 PM 9/21/2021     3:09 PM 4/7/2021     3:30 PM

## (undated) DEVICE — DRAPE ROBOTIC CAM ARM DISP ENDOWRIST DA VINCI SI

## (undated) DEVICE — BLADELESS OPTICAL TROCAR WITH FIXATION CANNULA: Brand: VERSAONE

## (undated) DEVICE — WARMER SCP LAP

## (undated) DEVICE — SUTURE VCRL SZ 3-0 L18IN ABSRB UD PS-2 L19MM 3/8 CRV PRIM J497H

## (undated) DEVICE — KENDALL SCD EXPRESS SLEEVES, KNEE LENGTH, MEDIUM: Brand: KENDALL SCD

## (undated) DEVICE — CADIERE FORCEPS: Brand: ENDOWRIST;DAVINCI SI

## (undated) DEVICE — SOLUTION IRRIG 3000ML 0.9% SOD CHL FLX CONT 0797208] ICU MEDICAL INC]

## (undated) DEVICE — DRAPE ROBOTIC CAM HD DISP ENDOWRIST DA VINCI SI

## (undated) DEVICE — COVER,TABLE,44X76,STERILE: Brand: MEDLINE

## (undated) DEVICE — REM POLYHESIVE ADULT PATIENT RETURN ELECTRODE: Brand: VALLEYLAB

## (undated) DEVICE — 2, DISPOSABLE SUCTION/IRRIGATOR WITHOUT DISPOSABLE TIP: Brand: STRYKEFLOW

## (undated) DEVICE — LOGICUT SCISSOR LENGTH 450MM: Brand: LOGI - LAPAROSCOPIC INSTRUMENT SYSTEM

## (undated) DEVICE — CANNULA NSL ORAL AD FOR CAPNOFLEX CO2 O2 AIRLFE

## (undated) DEVICE — (D)SYR 10ML 1/5ML GRAD NSAF -- PKGING CHANGE USE ITEM 338027

## (undated) DEVICE — Device

## (undated) DEVICE — STAPLER XL L26CM ULT UNIV HNDL ENDO GIA

## (undated) DEVICE — DRAPE,TOP,102X53,STERILE: Brand: MEDLINE

## (undated) DEVICE — ARTICULATING RELOAD WITH TRI-STAPLE TECHNOLOGY: Brand: ENDO GIA

## (undated) DEVICE — DRAPE INSTR ARM ROBOTIC ENDOWRIST DA VINCI S

## (undated) DEVICE — NEEDLE HYPO 21GA L1.5IN INTRAMUSCULAR S STL LATCH BVL UP

## (undated) DEVICE — CLIP APPLIER WITH CLIP LOGIC TECHNOLOGY: Brand: ENDO CLIP III

## (undated) DEVICE — BLADELESS TROCAR WITH FIXATION CANNULA: Brand: VERSAPORT PLUS

## (undated) DEVICE — 2000CC GUARDIAN II: Brand: GUARDIAN

## (undated) DEVICE — VESSEL SEALER: Brand: ENDOWRIST;DAVINCI SI

## (undated) DEVICE — SPONGE LAP 18X18IN STRL -- 5/PK

## (undated) DEVICE — CARDINAL HEALTH FLEXIBLE LIGHT HANDLE COVER: Brand: CARDINAL HEALTH

## (undated) DEVICE — BLACK INTELLIGENT RELOAD: Brand: TRI-STAPLE 2.0

## (undated) DEVICE — OPTICAL TROCAR WITH FIXATION CANNULA: Brand: VISIPORT PLUS

## (undated) DEVICE — SOL ANTI-FOG 6ML MEDC -- MEDICHOICE - CONVERT TO 358427

## (undated) DEVICE — CONTAINER PREFIL FRMLN 40ML --

## (undated) DEVICE — CONNECTOR TBNG OD5-7MM O2 END DISP

## (undated) DEVICE — BLUNT TIP LAPAROSCOPIC SEALER/DIVIDER: Brand: LIGASURE

## (undated) DEVICE — ELECTRO LUBE IS A SINGLE PATIENT USE DEVICE THAT IS INTENDED TO BE USED ON ELECTROSURGICAL ELECTRODES TO REDUCE STICKING.: Brand: KEY SURGICAL ELECTRO LUBE

## (undated) DEVICE — TIP COVER ACCESSORY

## (undated) DEVICE — SINGLE BASIN: Brand: CARDINAL HEALTH

## (undated) DEVICE — VISUALIZATION SYSTEM: Brand: CLEARIFY

## (undated) DEVICE — DERMABOND SKIN ADH 0.7ML -- DERMABOND ADVANCED 12/BX

## (undated) DEVICE — BLOCK BITE AD 60FR W/ VELC STRP ADDRESSES MOST PT AND

## (undated) DEVICE — OBTRTR BLDELSS 8MM DISP -- DA VINCI - SNGL USE

## (undated) DEVICE — LAP CHOLE: Brand: MEDLINE INDUSTRIES, INC.

## (undated) DEVICE — CONTAINER SPEC HISTOLOGY 900ML POLYPR

## (undated) DEVICE — SUTURE SZ 0 27IN 5/8 CIR UR-6  TAPER PT VIOLET ABSRB VICRYL J603H

## (undated) DEVICE — FORCEPS BX L240CM JAW DIA2.8MM L CAP W/ NDL MIC MESH TOOTH

## (undated) DEVICE — [HIGH FLOW INSUFFLATOR,  DO NOT USE IF PACKAGE IS DAMAGED,  KEEP DRY,  KEEP AWAY FROM SUNLIGHT,  PROTECT FROM HEAT AND RADIOACTIVE SOURCES.]: Brand: PNEUMOSURE

## (undated) DEVICE — CANNULA SEAL

## (undated) DEVICE — GOWN,REINF,POLY,ECL,PP SLV,XL: Brand: MEDLINE

## (undated) DEVICE — KENDALL RADIOLUCENT FOAM MONITORING ELECTRODE RECTANGULAR SHAPE: Brand: KENDALL

## (undated) DEVICE — (D)PREP SKN CHLRAPRP APPL 26ML -- CONVERT TO ITEM 371833

## (undated) DEVICE — BLADELESS OPTICAL TROCAR WITH FIXATION CANNULA: Brand: VERSAPORT

## (undated) DEVICE — MEDI-VAC NON-CONDUCTIVE SUCTION TUBING: Brand: CARDINAL HEALTH